# Patient Record
Sex: MALE | Race: WHITE | URBAN - METROPOLITAN AREA
[De-identification: names, ages, dates, MRNs, and addresses within clinical notes are randomized per-mention and may not be internally consistent; named-entity substitution may affect disease eponyms.]

---

## 2019-01-12 ENCOUNTER — INPATIENT (INPATIENT)
Facility: HOSPITAL | Age: 69
LOS: 8 days | Discharge: HOME HEALTH | End: 2019-01-21
Attending: THORACIC SURGERY (CARDIOTHORACIC VASCULAR SURGERY) | Admitting: THORACIC SURGERY (CARDIOTHORACIC VASCULAR SURGERY)
Payer: MEDICARE

## 2019-01-12 VITALS
HEIGHT: 70 IN | TEMPERATURE: 98 F | SYSTOLIC BLOOD PRESSURE: 150 MMHG | RESPIRATION RATE: 18 BRPM | WEIGHT: 218.92 LBS | HEART RATE: 111 BPM | OXYGEN SATURATION: 97 % | DIASTOLIC BLOOD PRESSURE: 81 MMHG

## 2019-01-12 LAB
ALBUMIN SERPL ELPH-MCNC: 4.2 G/DL — SIGNIFICANT CHANGE UP (ref 3.5–5.2)
ALP SERPL-CCNC: 74 U/L — SIGNIFICANT CHANGE UP (ref 30–115)
ALT FLD-CCNC: 19 U/L — SIGNIFICANT CHANGE UP (ref 0–41)
ANION GAP SERPL CALC-SCNC: 9 MMOL/L — SIGNIFICANT CHANGE UP (ref 7–14)
APTT BLD: 27.9 SEC — SIGNIFICANT CHANGE UP (ref 27–39.2)
AST SERPL-CCNC: 27 U/L — SIGNIFICANT CHANGE UP (ref 0–41)
BASOPHILS # BLD AUTO: 0.03 K/UL — SIGNIFICANT CHANGE UP (ref 0–0.2)
BASOPHILS NFR BLD AUTO: 0.4 % — SIGNIFICANT CHANGE UP (ref 0–1)
BILIRUB SERPL-MCNC: 1.1 MG/DL — SIGNIFICANT CHANGE UP (ref 0.2–1.2)
BUN SERPL-MCNC: 12 MG/DL — SIGNIFICANT CHANGE UP (ref 10–20)
CALCIUM SERPL-MCNC: 9.3 MG/DL — SIGNIFICANT CHANGE UP (ref 8.5–10.1)
CHLORIDE SERPL-SCNC: 102 MMOL/L — SIGNIFICANT CHANGE UP (ref 98–110)
CHOLEST SERPL-MCNC: 219 MG/DL — HIGH (ref 100–200)
CK MB CFR SERPL CALC: 21.1 NG/ML — HIGH (ref 0.6–6.3)
CK MB CFR SERPL CALC: 27.3 NG/ML — HIGH (ref 0.6–6.3)
CO2 SERPL-SCNC: 28 MMOL/L — SIGNIFICANT CHANGE UP (ref 17–32)
CREAT SERPL-MCNC: 0.8 MG/DL — SIGNIFICANT CHANGE UP (ref 0.7–1.5)
EOSINOPHIL # BLD AUTO: 0.13 K/UL — SIGNIFICANT CHANGE UP (ref 0–0.7)
EOSINOPHIL NFR BLD AUTO: 1.8 % — SIGNIFICANT CHANGE UP (ref 0–8)
GLUCOSE SERPL-MCNC: 150 MG/DL — HIGH (ref 70–99)
HCT VFR BLD CALC: 43.9 % — SIGNIFICANT CHANGE UP (ref 42–52)
HDLC SERPL-MCNC: 36 MG/DL — LOW
HGB BLD-MCNC: 15.1 G/DL — SIGNIFICANT CHANGE UP (ref 14–18)
IMM GRANULOCYTES NFR BLD AUTO: 0.3 % — SIGNIFICANT CHANGE UP (ref 0.1–0.3)
INR BLD: 1.15 RATIO — SIGNIFICANT CHANGE UP (ref 0.65–1.3)
LIPID PNL WITH DIRECT LDL SERPL: 177 MG/DL — HIGH (ref 4–129)
LYMPHOCYTES # BLD AUTO: 1.41 K/UL — SIGNIFICANT CHANGE UP (ref 1.2–3.4)
LYMPHOCYTES # BLD AUTO: 19.7 % — LOW (ref 20.5–51.1)
MCHC RBC-ENTMCNC: 28.5 PG — SIGNIFICANT CHANGE UP (ref 27–31)
MCHC RBC-ENTMCNC: 34.4 G/DL — SIGNIFICANT CHANGE UP (ref 32–37)
MCV RBC AUTO: 83 FL — SIGNIFICANT CHANGE UP (ref 80–94)
MONOCYTES # BLD AUTO: 0.47 K/UL — SIGNIFICANT CHANGE UP (ref 0.1–0.6)
MONOCYTES NFR BLD AUTO: 6.6 % — SIGNIFICANT CHANGE UP (ref 1.7–9.3)
NEUTROPHILS # BLD AUTO: 5.08 K/UL — SIGNIFICANT CHANGE UP (ref 1.4–6.5)
NEUTROPHILS NFR BLD AUTO: 71.2 % — SIGNIFICANT CHANGE UP (ref 42.2–75.2)
NRBC # BLD: 0 /100 WBCS — SIGNIFICANT CHANGE UP (ref 0–0)
PLATELET # BLD AUTO: 197 K/UL — SIGNIFICANT CHANGE UP (ref 130–400)
POTASSIUM SERPL-MCNC: 4.2 MMOL/L — SIGNIFICANT CHANGE UP (ref 3.5–5)
POTASSIUM SERPL-SCNC: 4.2 MMOL/L — SIGNIFICANT CHANGE UP (ref 3.5–5)
PROT SERPL-MCNC: 6.9 G/DL — SIGNIFICANT CHANGE UP (ref 6–8)
PROTHROM AB SERPL-ACNC: 12.5 SEC — SIGNIFICANT CHANGE UP (ref 9.95–12.87)
RBC # BLD: 5.29 M/UL — SIGNIFICANT CHANGE UP (ref 4.7–6.1)
RBC # FLD: 12 % — SIGNIFICANT CHANGE UP (ref 11.5–14.5)
SODIUM SERPL-SCNC: 139 MMOL/L — SIGNIFICANT CHANGE UP (ref 135–146)
TOTAL CHOLESTEROL/HDL RATIO MEASUREMENT: 6.1 RATIO — HIGH (ref 4–5.5)
TRIGL SERPL-MCNC: 104 MG/DL — SIGNIFICANT CHANGE UP (ref 10–149)
TROPONIN T SERPL-MCNC: 0.58 NG/ML — CRITICAL HIGH
TROPONIN T SERPL-MCNC: 0.65 NG/ML — CRITICAL HIGH
TROPONIN T SERPL-MCNC: 0.7 NG/ML — CRITICAL HIGH
WBC # BLD: 7.14 K/UL — SIGNIFICANT CHANGE UP (ref 4.8–10.8)
WBC # FLD AUTO: 7.14 K/UL — SIGNIFICANT CHANGE UP (ref 4.8–10.8)

## 2019-01-12 RX ORDER — PANTOPRAZOLE SODIUM 20 MG/1
40 TABLET, DELAYED RELEASE ORAL
Qty: 0 | Refills: 0 | Status: DISCONTINUED | OUTPATIENT
Start: 2019-01-12 | End: 2019-01-16

## 2019-01-12 RX ORDER — ASPIRIN/CALCIUM CARB/MAGNESIUM 324 MG
325 TABLET ORAL ONCE
Qty: 0 | Refills: 0 | Status: COMPLETED | OUTPATIENT
Start: 2019-01-12 | End: 2019-01-12

## 2019-01-12 RX ORDER — METOPROLOL TARTRATE 50 MG
25 TABLET ORAL DAILY
Qty: 0 | Refills: 0 | Status: DISCONTINUED | OUTPATIENT
Start: 2019-01-12 | End: 2019-01-15

## 2019-01-12 RX ORDER — ENOXAPARIN SODIUM 100 MG/ML
100 INJECTION SUBCUTANEOUS
Qty: 0 | Refills: 0 | Status: DISCONTINUED | OUTPATIENT
Start: 2019-01-12 | End: 2019-01-15

## 2019-01-12 RX ORDER — SODIUM CHLORIDE 9 MG/ML
1000 INJECTION INTRAMUSCULAR; INTRAVENOUS; SUBCUTANEOUS ONCE
Qty: 0 | Refills: 0 | Status: COMPLETED | OUTPATIENT
Start: 2019-01-12 | End: 2019-01-12

## 2019-01-12 RX ORDER — ATORVASTATIN CALCIUM 80 MG/1
80 TABLET, FILM COATED ORAL AT BEDTIME
Qty: 0 | Refills: 0 | Status: DISCONTINUED | OUTPATIENT
Start: 2019-01-12 | End: 2019-01-16

## 2019-01-12 RX ORDER — CLOPIDOGREL BISULFATE 75 MG/1
75 TABLET, FILM COATED ORAL DAILY
Qty: 0 | Refills: 0 | Status: DISCONTINUED | OUTPATIENT
Start: 2019-01-12 | End: 2019-01-14

## 2019-01-12 RX ORDER — ASPIRIN/CALCIUM CARB/MAGNESIUM 324 MG
81 TABLET ORAL DAILY
Qty: 0 | Refills: 0 | Status: DISCONTINUED | OUTPATIENT
Start: 2019-01-12 | End: 2019-01-16

## 2019-01-12 RX ORDER — INFLUENZA VIRUS VACCINE 15; 15; 15; 15 UG/.5ML; UG/.5ML; UG/.5ML; UG/.5ML
0.5 SUSPENSION INTRAMUSCULAR ONCE
Qty: 0 | Refills: 0 | Status: DISCONTINUED | OUTPATIENT
Start: 2019-01-12 | End: 2019-01-14

## 2019-01-12 RX ADMIN — Medication 20 MILLIGRAM(S): at 17:43

## 2019-01-12 RX ADMIN — CLOPIDOGREL BISULFATE 75 MILLIGRAM(S): 75 TABLET, FILM COATED ORAL at 17:43

## 2019-01-12 RX ADMIN — ENOXAPARIN SODIUM 100 MILLIGRAM(S): 100 INJECTION SUBCUTANEOUS at 17:43

## 2019-01-12 RX ADMIN — Medication 25 MILLIGRAM(S): at 17:43

## 2019-01-12 RX ADMIN — ATORVASTATIN CALCIUM 80 MILLIGRAM(S): 80 TABLET, FILM COATED ORAL at 22:01

## 2019-01-12 RX ADMIN — Medication 325 MILLIGRAM(S): at 13:21

## 2019-01-12 RX ADMIN — PANTOPRAZOLE SODIUM 40 MILLIGRAM(S): 20 TABLET, DELAYED RELEASE ORAL at 17:44

## 2019-01-12 RX ADMIN — SODIUM CHLORIDE 2000 MILLILITER(S): 9 INJECTION INTRAMUSCULAR; INTRAVENOUS; SUBCUTANEOUS at 12:03

## 2019-01-12 NOTE — ED PROVIDER NOTE - PROGRESS NOTE DETAILS
Lab called with trop of 0.58 - will give ASA. EKG shows inverted t waves in anterior leads. Pt to be admitted to CCU tele as per Dr. Potts. Will admit to hospitalist and consult cardiology.

## 2019-01-12 NOTE — CONSULT NOTE ADULT - SUBJECTIVE AND OBJECTIVE BOX
Patient is a 68y old  Male who presents with a chief complaint of chest pain.     HPI: 69yo M with h/o anxiety p/w intermittent chest pain for one week in the mid sternal region. It was worse last night, 5/10 intensity and so he came to ER.    No smoking or alcohol or family history of CVD.   Denies any other complaints. No fever, nausea, vomiting, SOB, bowel and bladder probs.    Lives at home with family. Fully functional.       PAST MEDICAL & SURGICAL HISTORY:  Depression  No significant past surgical history    Allergies  cats (Unknown)  No Known Drug Allergies      Family history : no cardiovascular family history     Home Medications:  Paxil 20 mg oral tablet: 1 tab(s) orally once a day (12 Jan 2019 11:29)    Occupation:  Alochol: Denied  Smoking: Denied  Drug Use: Denied  Marital Status:         ROS: as in HPI; All other systems reviewed are negative    ICU Vital Signs Last 24 Hrs  T(C): 36.1 (12 Jan 2019 13:23), Max: 36.6 (12 Jan 2019 11:21)  T(F): 97 (12 Jan 2019 13:23), Max: 97.9 (12 Jan 2019 11:21)  HR: 68 (12 Jan 2019 13:23) (68 - 111)  BP: 152/80 (12 Jan 2019 13:23) (150/81 - 153/89)  BP(mean): --  ABP: --  ABP(mean): --  RR: 18 (12 Jan 2019 13:23) (18 - 18)  SpO2: 97% (12 Jan 2019 13:23) (97% - 98%)        Physical Examination:    General: No acute distress.  Alert, oriented, interactive, nonfocal    HEENT: Pupils equal, reactive to light.  Symmetric.    PULM: Clear to auscultation bilaterally, no significant sputum production    CVS: Regular rate and rhythm, no murmurs, rubs, or gallops    ABD: Soft, nondistended, nontender, normoactive bowel sounds, no masses    EXT: No edema, nontender, no clubbing     SKIN: Warm and well perfused, no rashes noted.    Neurology : no motor or sensory deficit     Musculoskeletal : move all extremety     Lymphatic system: no Palpable node               I&O's Detail        LABS:                        15.1   7.14  )-----------( 197      ( 12 Jan 2019 11:42 )             43.9     12 Jan 2019 11:42    139    |  102    |  12     ----------------------------<  150    4.2     |  28     |  0.8      Ca    9.3        12 Jan 2019 11:42    TPro  6.9    /  Alb  4.2    /  TBili  1.1    /  DBili  x      /  AST  27     /  ALT  19     /  AlkPhos  74     12 Jan 2019 11:42  Amylase x     lipase x          CARDIAC MARKERS ( 12 Jan 2019 11:46 )  x     / 0.58 ng/mL / x     / x     / x          CAPILLARY BLOOD GLUCOSE            Culture        MEDICATIONS  (STANDING):    MEDICATIONS  (PRN):        RADIOLOGY: ***     CXR:  TLC:  OG:  ET tube:          ECHO: Patient is a 68y old  Male who presents with a chief complaint of chest pain.     HP  Pt is a 69 y/o Male, PMHX depression/ anxiety on Paxil, presents to ED w/ chest pain intermittently for the last 1 week. Pt states symptoms are worse at night. Denies fever/chills, cough, hemoptysis, URI symptoms, abdominal pain, n/v/d, back pain, numbness, tingling, weakness, smoking, cardiac hx/sx, family hx of cardiac disease, hx of DVT/PE, recent sx, recent travel, trauma/injury.  No smoking or alcohol or family history of CVD.   Denies any other complaints. No fever, nausea, vomiting, SOB, bowel and bladder probs. In Ed found to have high troponin with EKG changes called to evaluate.    Lives at home with family. Fully functional.       PAST MEDICAL & SURGICAL HISTORY:  Depression  No significant past surgical history    Allergies  cats (Unknown)  No Known Drug Allergies      Family history : no cardiovascular family history     Home Medications:  Paxil 20 mg oral tablet: 1 tab(s) orally once a day (12 Jan 2019 11:29)    Occupation:  Alcohol: Denied  Smoking: Denied  Drug Use: Denied  Marital Status:         ROS: as in HPI; All other systems reviewed are negative    ICU Vital Signs Last 24 Hrs  T(C): 36.1 (12 Jan 2019 13:23), Max: 36.6 (12 Jan 2019 11:21)  T(F): 97 (12 Jan 2019 13:23), Max: 97.9 (12 Jan 2019 11:21)  HR: 68 (12 Jan 2019 13:23) (68 - 111)  BP: 152/80 (12 Jan 2019 13:23) (150/81 - 153/89)  RR: 18 (12 Jan 2019 13:23) (18 - 18)  SpO2: 97% (12 Jan 2019 13:23) (97% - 98%)        Physical Examination:    General: No acute distress.  Alert, oriented, interactive, nonfocal    HEENT: Pupils equal, reactive to light.  Symmetric.    PULM: Clear to auscultation bilaterally, no significant sputum production    CVS: Regular rate and rhythm, no murmurs, rubs, or gallops    ABD: Soft, nondistended, nontender, normoactive bowel sounds, no masses    EXT: No edema, nontender, no clubbing     SKIN: Warm and well perfused, no rashes noted.    Neurology : no motor or sensory deficit     Lymphatic system: no Palpable node               I&O's Detail        LABS:                        15.1   7.14  )-----------( 197      ( 12 Jan 2019 11:42 )             43.9     12 Jan 2019 11:42    139    |  102    |  12     ----------------------------<  150    4.2     |  28     |  0.8      Ca    9.3        12 Jan 2019 11:42    TPro  6.9    /  Alb  4.2    /  TBili  1.1    /  DBili  x      /  AST  27     /  ALT  19     /  AlkPhos  74     12 Jan 2019 11:42  Amylase x     lipase x          CARDIAC MARKERS ( 12 Jan 2019 11:46 )  x     / 0.58 ng/mL / x     / x     / x          EKG/ CXR reviewed

## 2019-01-12 NOTE — ED PROVIDER NOTE - PHYSICAL EXAMINATION
VITAL SIGNS: I have reviewed the initial vital signs.   CONSTITUTIONAL: Awake, alert. Well-developed; well-nourished; in no distress. Non-toxic appearing.   SKIN: No rash, vesicles/lesion, abrasions or lacerations. No ecchymosis or signs of trauma.   HEAD: Normocephalic; atraumatic.   EYES: Symmetrical, no discharge or signs of trauma. Conjunctiva and sclera clear.  ENT: Airway patent. MMM.   NECK: Supple; non-tender.   CARD: No chest wall deformity or tenderness. S1, S2 normal; no murmurs, gallops, or rubs. Regular rate and rhythm.  RESP: Good air movement. Lungs CTAB. No crackles, wheezes, rales or rhonchi.  ABD: Soft; non-distended; non-tender.   EXT: No bony deformity or tenderness. Normal ROM x 4 extremities.   NEURO: A&Ox3. GCS 15. Normal speech.

## 2019-01-12 NOTE — ED ADULT NURSE NOTE - NSIMPLEMENTINTERV_GEN_ALL_ED
Implemented All Universal Safety Interventions:  [place] to call system. Call bell, personal items and telephone within reach. Instruct patient to call for assistance. Room bathroom lighting operational. Non-slip footwear when patient is off stretcher. Physically safe environment: no spills, clutter or unnecessary equipment. Stretcher in lowest position, wheels locked, appropriate side rails in place.

## 2019-01-12 NOTE — ED PROVIDER NOTE - MEDICAL DECISION MAKING DETAILS
Pt presents with prolonged chest pain last night. ACS evaluation found pt + troponin. Findings consistent with NONST elevation MI> Pt advised about need to be admitted for further monitoring and testing.

## 2019-01-12 NOTE — ED PROVIDER NOTE - NS ED ROS FT
Except as documented in HPI, all other ROS negative.   GENERAL: Denies fever/chills, loss of appetite/weight or fatigue.  SKIN: Denies rashes, abrasions, lacerations, ecchymosis, erythema, or edema.  HEAD: Denies headache, dizziness or trauma.  CARDIAC: Denies chest pain, palpitations, or SOB.   RESPIRATORY: Denies SOB, cough, hemoptysis or wheezing.   GI: Denies abdominal pain, n/v/d, bloody stools or melena.   MSK: Denies myalgias, bony deformity or pain.   NEURO: Denies paresthesias, tingling, weakness.

## 2019-01-12 NOTE — CONSULT NOTE ADULT - ASSESSMENT
IMPRESSION: ACS        PLAN:    CNS: Alert and oriented.  No sedation.     HEENT: Oral care. HOB > 45 degrees.      PULMONARY: Nasal cannula PRN. Keep pulse ox>92%    CARDIOVASCULAR: 2de cho. CE x 2. Cardiology eval. Heparin drip for now. ASA, lipitor, beta blocker.     GI: GI prophylaxis.  Feeding     RENAL: F/u lytes.    INFECTIOUS DISEASE: Pan cx.      HEMATOLOGICAL:  DVT prophylaxis.    ENDOCRINE:  Follow up FS.  Insulin protocol if needed.    MUSCULOSKELETAL:        CRITICAL CARE TIME SPENT: *** IMPRESSION: ACS        PLAN:    CNS: Alert and oriented.  No sedation.     HEENT: Oral care. HOB > 45 degrees.      PULMONARY: Nasal cannula PRN. Keep pulse ox>92%    CARDIOVASCULAR: 2de cho. CE x 2. Cardiology eval. Therapeutic lovenox for now. ASA, lipitor, beta blocker.     GI: GI prophylaxis.  Feeding     RENAL: F/u lytes.    INFECTIOUS DISEASE: Pan cx.      HEMATOLOGICAL:  DVT prophylaxis.    ENDOCRINE:  Follow up FS.  Insulin protocol if needed.    MUSCULOSKELETAL:        CRITICAL CARE TIME SPENT: *** IMPRESSION:     CP/ HIGH TROP/ NTEMI        PLAN:    CNS: Avoid CNS depressant    HEENT: Oral care. HOB > 45 degrees.      PULMONARY: Keep pulse ox>92%    CARDIOVASCULAR: 2de cho. CE x 2. Cardiology eval. Therapeutic lovenox for now. ASA, lipitor, beta blocker. repeat EKG    GI: GI prophylaxis.  Feeding     RENAL: F/u lytes.    INFECTIOUS DISEASE: Pan cx.      HEMATOLOGICAL:  DVT prophylaxis.    ENDOCRINE:  Follow up FS.  Insulin protocol if needed.    ADMIT TO CCU

## 2019-01-12 NOTE — ED ADULT NURSE NOTE - OBJECTIVE STATEMENT
Patient denied pain at rounds , had pain yesterday and intermittently x 1 week . He was sent to ER with EKG changes

## 2019-01-12 NOTE — ED PROVIDER NOTE - ATTENDING CONTRIBUTION TO CARE
One week of chest pain. Pain with exertion. On exam s1S2 rrr, lungs clear, abdomen is soft nontender, ext neg for swelling or tenderness.

## 2019-01-12 NOTE — ED PROVIDER NOTE - OBJECTIVE STATEMENT
Pt is a 69 y/o Male, PMHX depression on Paxil, presents to ED w/ right sided chest pain intermittently for the last 1 week. Pt states symptoms are worse at night. Denies fever/chills, cough, hemoptysis, URI symptoms, abdominal pain, n/v/d, back pain, numbness, tingling, weakness, smoking, cardiac hx/sx, family hx of cardiac disease, hx of DVT/PE, recent sx, recent travel, trauma/injury.

## 2019-01-13 LAB
ALBUMIN SERPL ELPH-MCNC: 4 G/DL — SIGNIFICANT CHANGE UP (ref 3.5–5.2)
ALP SERPL-CCNC: 70 U/L — SIGNIFICANT CHANGE UP (ref 30–115)
ALT FLD-CCNC: 16 U/L — SIGNIFICANT CHANGE UP (ref 0–41)
ANION GAP SERPL CALC-SCNC: 9 MMOL/L — SIGNIFICANT CHANGE UP (ref 7–14)
APTT BLD: 29.3 SEC — SIGNIFICANT CHANGE UP (ref 27–39.2)
AST SERPL-CCNC: 20 U/L — SIGNIFICANT CHANGE UP (ref 0–41)
BILIRUB SERPL-MCNC: 1.9 MG/DL — HIGH (ref 0.2–1.2)
BUN SERPL-MCNC: 12 MG/DL — SIGNIFICANT CHANGE UP (ref 10–20)
CALCIUM SERPL-MCNC: 9 MG/DL — SIGNIFICANT CHANGE UP (ref 8.5–10.1)
CHLORIDE SERPL-SCNC: 104 MMOL/L — SIGNIFICANT CHANGE UP (ref 98–110)
CHOLEST SERPL-MCNC: 201 MG/DL — HIGH (ref 100–200)
CK MB CFR SERPL CALC: 12.8 NG/ML — HIGH (ref 0.6–6.3)
CK SERPL-CCNC: 267 U/L — HIGH (ref 0–225)
CO2 SERPL-SCNC: 28 MMOL/L — SIGNIFICANT CHANGE UP (ref 17–32)
CREAT SERPL-MCNC: 0.9 MG/DL — SIGNIFICANT CHANGE UP (ref 0.7–1.5)
GLUCOSE SERPL-MCNC: 128 MG/DL — HIGH (ref 70–99)
HCT VFR BLD CALC: 43.1 % — SIGNIFICANT CHANGE UP (ref 42–52)
HDLC SERPL-MCNC: 33 MG/DL — LOW
HGB BLD-MCNC: 14.9 G/DL — SIGNIFICANT CHANGE UP (ref 14–18)
INR BLD: 1.19 RATIO — SIGNIFICANT CHANGE UP (ref 0.65–1.3)
LIPID PNL WITH DIRECT LDL SERPL: 161 MG/DL — HIGH (ref 4–129)
MAGNESIUM SERPL-MCNC: 2.1 MG/DL — SIGNIFICANT CHANGE UP (ref 1.8–2.4)
MCHC RBC-ENTMCNC: 28.9 PG — SIGNIFICANT CHANGE UP (ref 27–31)
MCHC RBC-ENTMCNC: 34.6 G/DL — SIGNIFICANT CHANGE UP (ref 32–37)
MCV RBC AUTO: 83.7 FL — SIGNIFICANT CHANGE UP (ref 80–94)
NRBC # BLD: 0 /100 WBCS — SIGNIFICANT CHANGE UP (ref 0–0)
PLATELET # BLD AUTO: 195 K/UL — SIGNIFICANT CHANGE UP (ref 130–400)
POTASSIUM SERPL-MCNC: 4.5 MMOL/L — SIGNIFICANT CHANGE UP (ref 3.5–5)
POTASSIUM SERPL-SCNC: 4.5 MMOL/L — SIGNIFICANT CHANGE UP (ref 3.5–5)
PROT SERPL-MCNC: 6.4 G/DL — SIGNIFICANT CHANGE UP (ref 6–8)
PROTHROM AB SERPL-ACNC: 12.9 SEC — HIGH (ref 9.95–12.87)
RBC # BLD: 5.15 M/UL — SIGNIFICANT CHANGE UP (ref 4.7–6.1)
RBC # FLD: 12 % — SIGNIFICANT CHANGE UP (ref 11.5–14.5)
SODIUM SERPL-SCNC: 141 MMOL/L — SIGNIFICANT CHANGE UP (ref 135–146)
TOTAL CHOLESTEROL/HDL RATIO MEASUREMENT: 6.1 RATIO — HIGH (ref 4–5.5)
TRIGL SERPL-MCNC: 96 MG/DL — SIGNIFICANT CHANGE UP (ref 10–149)
TROPONIN T SERPL-MCNC: 0.59 NG/ML — CRITICAL HIGH
TYPE + AB SCN PNL BLD: SIGNIFICANT CHANGE UP
WBC # BLD: 5.89 K/UL — SIGNIFICANT CHANGE UP (ref 4.8–10.8)
WBC # FLD AUTO: 5.89 K/UL — SIGNIFICANT CHANGE UP (ref 4.8–10.8)

## 2019-01-13 RX ORDER — CHLORHEXIDINE GLUCONATE 213 G/1000ML
1 SOLUTION TOPICAL
Qty: 0 | Refills: 0 | Status: DISCONTINUED | OUTPATIENT
Start: 2019-01-13 | End: 2019-01-16

## 2019-01-13 RX ADMIN — Medication 25 MILLIGRAM(S): at 06:44

## 2019-01-13 RX ADMIN — CLOPIDOGREL BISULFATE 75 MILLIGRAM(S): 75 TABLET, FILM COATED ORAL at 11:50

## 2019-01-13 RX ADMIN — Medication 81 MILLIGRAM(S): at 11:50

## 2019-01-13 RX ADMIN — ENOXAPARIN SODIUM 100 MILLIGRAM(S): 100 INJECTION SUBCUTANEOUS at 17:28

## 2019-01-13 RX ADMIN — ENOXAPARIN SODIUM 100 MILLIGRAM(S): 100 INJECTION SUBCUTANEOUS at 07:58

## 2019-01-13 RX ADMIN — ATORVASTATIN CALCIUM 80 MILLIGRAM(S): 80 TABLET, FILM COATED ORAL at 21:31

## 2019-01-13 RX ADMIN — PANTOPRAZOLE SODIUM 40 MILLIGRAM(S): 20 TABLET, DELAYED RELEASE ORAL at 07:59

## 2019-01-13 RX ADMIN — Medication 20 MILLIGRAM(S): at 11:50

## 2019-01-13 NOTE — H&P ADULT - ASSESSMENT
69 yo M presents with complaint of intermittent chest pain for 1 week's duration.     NSTEMI  -EKG revealing of inverted t waves in anterior leads, positive troponemia, trending down  -Awaiting Cardiology evaluation  -Continue statin, toprol, ASA, plavix  -F/U 2D-Echo  -F/U lipid panel, HgbA1C%    Depression  -Continue paxil    Disposition: Home when stable

## 2019-01-13 NOTE — PROGRESS NOTE ADULT - SUBJECTIVE AND OBJECTIVE BOX
CARDIOLOGY CONSULT NOTE     CHIEF COMPLAINT/REASON FOR CONSULT:    HPI: 68 [place] male . He one week ago developed chest walking up steps. This got worse. He then had pain at rest. No pain now      PAST MEDICAL & SURGICAL HISTORY:  Depression  No significant past surgical history      Cardiac Risks:   [ ]HTN, [ ] DM, [ ] Smoking, [ ] FH,  [ x] Lipids        MEDICATIONS:  MEDICATIONS  (STANDING):  aspirin  chewable 81 milliGRAM(s) Oral daily  atorvastatin 80 milliGRAM(s) Oral at bedtime  clopidogrel Tablet 75 milliGRAM(s) Oral daily  enoxaparin Injectable 100 milliGRAM(s) SubCutaneous two times a day  influenza   Vaccine 0.5 milliLiter(s) IntraMuscular once  metoprolol succinate ER 25 milliGRAM(s) Oral daily  pantoprazole    Tablet 40 milliGRAM(s) Oral before breakfast  PARoxetine 20 milliGRAM(s) Oral daily      FAMILY HISTORY:      SOCIAL HISTORY:      [ ] Marital status   Allergies    cats (Unknown)  No Known Drug Allergies    Intolerances    	    REVIEW OF SYSTEMS:  CONSTITUTIONAL: No fever, weight loss, or fatigue  EYES: No eye pain, visual disturbances, or discharge  ENMT:  No difficulty hearing, tinnitus, vertigo; No sinus or throat pain  NECK: No pain or stiffness  RESPIRATORY: No cough, wheezing, chills or hemoptysis; No Shortness of Breath  CARDIOVASCULAR: See above  GASTROINTESTINAL: No abdominal or epigastric pain. No nausea, vomiting, or hematemesis; No diarrhea or constipation. No melena or hematochezia.  GENITOURINARY: No dysuria, frequency, hematuria, or incontinence  NEUROLOGICAL: No headaches, memory loss, loss of strength, numbness, or tremors  SKIN: No itching, burning, rashes, or lesions   	        PHYSICAL EXAM:  T(C): 36.2 (01-12-19 @ 23:10), Max: 37 (01-12-19 @ 15:30)  HR: 67 (01-13-19 @ 06:50) (57 - 111)  BP: 115/72 (01-13-19 @ 06:50) (115/72 - 153/89)  RR: 18 (01-12-19 @ 23:10) (18 - 18)  SpO2: 96% (01-13-19 @ 06:50) (95% - 98%)  Wt(kg): --  I&O's Summary    12 Jan 2019 07:01 - 13 Jan 2019 07:00  --------------------------------------------------------  IN: 0 mL / OUT: 250 mL / NET: -250 mL        Appearance: Normal	  Psychiatry: A & O x 3, Mood & affect appropriate  HEENT:   Normal oral mucosa, PERRL, EOMI	  Lymphatic: No lymphadenopathy  Cardiovascular: Normal S1 S2,RRR, No JVD, No murmurs  Respiratory: Lungs clear to auscultation	  Gastrointestinal:  Soft, Non-tender, + BS	  Skin: No rashes, No ecchymoses, No cyanosis	  Neurologic: Non-focal  Extremities: Normal range of motion, No clubbing, cyanosis or edema  Vascular: Peripheral pulses palpable 2+ bilaterally      ECG:  	NSR ant ischemia    	  LABS:	 	    CARDIAC MARKERS:                                    14.9   5.89  )-----------( 195      ( 13 Jan 2019 06:35 )             43.1     01-13    141  |  104  |  12  ----------------------------<  128<H>  4.5   |  28  |  0.9    Ca    9.0      13 Jan 2019 06:35  Mg     2.1     01-13    TPro  6.4  /  Alb  4.0  /  TBili  1.9<H>  /  DBili  x   /  AST  20  /  ALT  16  /  AlkPhos  70  01-13    PT/INR - ( 13 Jan 2019 06:35 )   PT: 12.90 sec;   INR: 1.19 ratio         PTT - ( 13 Jan 2019 06:35 )  PTT:29.3 sec

## 2019-01-13 NOTE — H&P ADULT - HISTORY OF PRESENT ILLNESS
67 yo M with PMHx of depression/ anxiety on Paxil, presents to ED with complaint of intermittent chest pain for 1 week's duration. Pt states symptoms are worse at night. Denies fever/chills, cough, hemoptysis, URI symptoms, abdominal pain, n/v/d, back pain, numbness, tingling, weakness, family hx of cardiac disease, hx of DVT/PE, recent sx, recent travel, trauma/injury. 67 yo M with PMHx of depression/ anxiety on Paxil, presents to ED with complaint of intermittent chest pain for 1 week's duration. Pt states symptoms are worse at night. Denies fever/chills, cough, hemoptysis, URI symptoms, abdominal pain, n/v/d, back pain, numbness, tingling, weakness, family hx of cardiac disease, hx of DVT/PE, recent sx, recent travel, trauma/injury. Chest pain described as sharp, intermittent, nonradiating, occurs at rest, occurs randomly, not worsened with exertion. Never had stress test, never smoker. No family history of cardiac disease.

## 2019-01-13 NOTE — H&P ADULT - NSHPLABSRESULTS_GEN_ALL_CORE
14.9   5.89  )-----------( 195      ( 13 Jan 2019 06:35 )             43.1       01-13    141  |  104  |  12  ----------------------------<  128<H>  4.5   |  28  |  0.9    Ca    9.0      13 Jan 2019 06:35  Mg     2.1     01-13    TPro  6.4  /  Alb  4.0  /  TBili  1.9<H>  /  DBili  x   /  AST  20  /  ALT  16  /  AlkPhos  70  01-13            PT/INR - ( 13 Jan 2019 06:35 )   PT: 12.90 sec;   INR: 1.19 ratio         PTT - ( 13 Jan 2019 06:35 )  PTT:29.3 sec        CARDIAC MARKERS ( 12 Jan 2019 18:50 )  x     / 0.65 ng/mL / x     / x     / 21.1 ng/mL  CARDIAC MARKERS ( 12 Jan 2019 14:43 )  x     / 0.70 ng/mL / x     / x     / 27.3 ng/mL  CARDIAC MARKERS ( 12 Jan 2019 11:46 )  x     / 0.58 ng/mL / x     / x     / x

## 2019-01-13 NOTE — H&P ADULT - ATTENDING COMMENTS
#chest pain  treating as ACS per cards  therapeutic lovenox for 48 hours  asa, plavix  lipitor 80  check TTE  f/u cards if need LHC

## 2019-01-14 LAB
ALBUMIN SERPL ELPH-MCNC: 4.1 G/DL — SIGNIFICANT CHANGE UP (ref 3.5–5.2)
ALP SERPL-CCNC: 71 U/L — SIGNIFICANT CHANGE UP (ref 30–115)
ALT FLD-CCNC: 17 U/L — SIGNIFICANT CHANGE UP (ref 0–41)
ANION GAP SERPL CALC-SCNC: 10 MMOL/L — SIGNIFICANT CHANGE UP (ref 7–14)
APTT BLD: 32.3 SEC — SIGNIFICANT CHANGE UP (ref 27–39.2)
AST SERPL-CCNC: 18 U/L — SIGNIFICANT CHANGE UP (ref 0–41)
BILIRUB SERPL-MCNC: 1.5 MG/DL — HIGH (ref 0.2–1.2)
BUN SERPL-MCNC: 15 MG/DL — SIGNIFICANT CHANGE UP (ref 10–20)
CALCIUM SERPL-MCNC: 9.3 MG/DL — SIGNIFICANT CHANGE UP (ref 8.5–10.1)
CHLORIDE SERPL-SCNC: 104 MMOL/L — SIGNIFICANT CHANGE UP (ref 98–110)
CK MB CFR SERPL CALC: 10.2 NG/ML — HIGH (ref 0.6–6.3)
CK SERPL-CCNC: 280 U/L — HIGH (ref 0–225)
CO2 SERPL-SCNC: 28 MMOL/L — SIGNIFICANT CHANGE UP (ref 17–32)
CREAT SERPL-MCNC: 0.9 MG/DL — SIGNIFICANT CHANGE UP (ref 0.7–1.5)
ESTIMATED AVERAGE GLUCOSE: 137 MG/DL — HIGH (ref 68–114)
GLUCOSE BLDC GLUCOMTR-MCNC: 115 MG/DL — HIGH (ref 70–99)
GLUCOSE SERPL-MCNC: 124 MG/DL — HIGH (ref 70–99)
HBA1C BLD-MCNC: 6.4 % — HIGH (ref 4–5.6)
HCT VFR BLD CALC: 43.3 % — SIGNIFICANT CHANGE UP (ref 42–52)
HGB BLD-MCNC: 15 G/DL — SIGNIFICANT CHANGE UP (ref 14–18)
INR BLD: 1.18 RATIO — SIGNIFICANT CHANGE UP (ref 0.65–1.3)
MAGNESIUM SERPL-MCNC: 2.1 MG/DL — SIGNIFICANT CHANGE UP (ref 1.8–2.4)
MCHC RBC-ENTMCNC: 29 PG — SIGNIFICANT CHANGE UP (ref 27–31)
MCHC RBC-ENTMCNC: 34.6 G/DL — SIGNIFICANT CHANGE UP (ref 32–37)
MCV RBC AUTO: 83.8 FL — SIGNIFICANT CHANGE UP (ref 80–94)
NRBC # BLD: 0 /100 WBCS — SIGNIFICANT CHANGE UP (ref 0–0)
PLATELET # BLD AUTO: 197 K/UL — SIGNIFICANT CHANGE UP (ref 130–400)
POTASSIUM SERPL-MCNC: 4.5 MMOL/L — SIGNIFICANT CHANGE UP (ref 3.5–5)
POTASSIUM SERPL-SCNC: 4.5 MMOL/L — SIGNIFICANT CHANGE UP (ref 3.5–5)
PROT SERPL-MCNC: 6.6 G/DL — SIGNIFICANT CHANGE UP (ref 6–8)
PROTHROM AB SERPL-ACNC: 12.8 SEC — SIGNIFICANT CHANGE UP (ref 9.95–12.87)
RBC # BLD: 5.17 M/UL — SIGNIFICANT CHANGE UP (ref 4.7–6.1)
RBC # FLD: 11.9 % — SIGNIFICANT CHANGE UP (ref 11.5–14.5)
SODIUM SERPL-SCNC: 142 MMOL/L — SIGNIFICANT CHANGE UP (ref 135–146)
TROPONIN T SERPL-MCNC: 0.64 NG/ML — CRITICAL HIGH
WBC # BLD: 5.98 K/UL — SIGNIFICANT CHANGE UP (ref 4.8–10.8)
WBC # FLD AUTO: 5.98 K/UL — SIGNIFICANT CHANGE UP (ref 4.8–10.8)

## 2019-01-14 PROCEDURE — 93880 EXTRACRANIAL BILAT STUDY: CPT | Mod: 26

## 2019-01-14 RX ORDER — SODIUM CHLORIDE 9 MG/ML
400 INJECTION INTRAMUSCULAR; INTRAVENOUS; SUBCUTANEOUS
Qty: 0 | Refills: 0 | Status: DISCONTINUED | OUTPATIENT
Start: 2019-01-14 | End: 2019-01-15

## 2019-01-14 RX ADMIN — Medication 20 MILLIGRAM(S): at 10:01

## 2019-01-14 RX ADMIN — CHLORHEXIDINE GLUCONATE 1 APPLICATION(S): 213 SOLUTION TOPICAL at 10:00

## 2019-01-14 RX ADMIN — Medication 25 MILLIGRAM(S): at 06:00

## 2019-01-14 RX ADMIN — Medication 81 MILLIGRAM(S): at 10:01

## 2019-01-14 RX ADMIN — ATORVASTATIN CALCIUM 80 MILLIGRAM(S): 80 TABLET, FILM COATED ORAL at 23:43

## 2019-01-14 RX ADMIN — PANTOPRAZOLE SODIUM 40 MILLIGRAM(S): 20 TABLET, DELAYED RELEASE ORAL at 06:00

## 2019-01-14 RX ADMIN — CLOPIDOGREL BISULFATE 75 MILLIGRAM(S): 75 TABLET, FILM COATED ORAL at 10:01

## 2019-01-14 NOTE — PROGRESS NOTE ADULT - SUBJECTIVE AND OBJECTIVE BOX
Patient is a 68y old  Male who presents with a chief complaint of chest pain (13 Jan 2019 08:23)      T(F): 97.7 (01-13-19 @ 23:10), Max: 98.4 (01-13-19 @ 15:23)  HR: 63 (01-14-19 @ 06:00)  BP: 119/77 (01-14-19 @ 06:00)  RR: 18 (01-14-19 @ 06:00)  SpO2: 93% (01-14-19 @ 06:00) (93% - 98%)    PHYSICAL EXAM:  GENERAL: NAD, well-groomed, well-developed  HEAD:  Atraumatic, Normocephalic  EYES: EOMI, PERRLA, conjunctiva and sclera clear  ENMT: No tonsillar erythema, exudates, or enlargement; Moist mucous membranes, Good dentition, No lesions  NECK: Supple, No JVD, Normal thyroid  NERVOUS SYSTEM:  Alert & Oriented X3,  Motor Strength 5/5 B/L upper and lower extremities  CHEST/LUNG: Clear to percussion bilaterally; No rales, rhonchi, wheezing, or rubs  HEART: Regular rate and rhythm; No murmurs, rubs, or gallops  ABDOMEN: Soft, Nontender, Nondistended; Bowel sounds present  EXTREMITIES:   No clubbing, cyanosis, or edema  LYMPH: No lymphadenopathy noted  SKIN: No rashes or lesions    labs  01-13    141  |  104  |  12  ----------------------------<  128<H>  4.5   |  28  |  0.9    Ca    9.0      13 Jan 2019 06:35  Mg     2.1     01-13    TPro  6.4  /  Alb  4.0  /  TBili  1.9<H>  /  DBili  x   /  AST  20  /  ALT  16  /  AlkPhos  70  01-13                          15.0   5.98  )-----------( 197      ( 14 Jan 2019 07:08 )             43.3       PT/INR - ( 13 Jan 2019 06:35 )   PT: 12.90 sec;   INR: 1.19 ratio         PTT - ( 13 Jan 2019 06:35 )  PTT:29.3 sec    Creatine Kinase, Serum: 267 U/L <H> (01-13-19 @ 10:44)  Troponin T, Serum: 0.59 ng/mL <HH> (01-13-19 @ 10:44)      aspirin  chewable 81 milliGRAM(s) Oral daily  atorvastatin 80 milliGRAM(s) Oral at bedtime  chlorhexidine 4% Liquid 1 Application(s) Topical <User Schedule>  clopidogrel Tablet 75 milliGRAM(s) Oral daily  enoxaparin Injectable 100 milliGRAM(s) SubCutaneous two times a day  influenza   Vaccine 0.5 milliLiter(s) IntraMuscular once  metoprolol succinate ER 25 milliGRAM(s) Oral daily  pantoprazole    Tablet 40 milliGRAM(s) Oral before breakfast  PARoxetine 20 milliGRAM(s) Oral daily

## 2019-01-14 NOTE — CONSULT NOTE ADULT - SUBJECTIVE AND OBJECTIVE BOX
Surgeon: Dr. Drake    Consult requesting by: Dr. Raymond  PMD: Dr. Vu    HISTORY OF PRESENT ILLNESS: 67 yo M with PMHx of depression/anxiety on Paxil, presents to ED with complaints of intermittent chest pain for 1 week duration. Pt states symptoms are worse at night and occasionally occurs at rest. Denies fever/chills, cough, hemoptysis, URI symptoms, abdominal pain, n/v/d, back pain, numbness, tingling, weakness, family hx of cardiac disease, hx of DVT/PE, recent sx, recent travel, trauma/injury. Chest pain described as sharp, intermittent, non-radiating, occurs at rest, occurs randomly, not worsened with exertion. Never had stress test, never smoker. No family history of cardiac disease. (13 Jan 2019 08:23). Patient was evaluated by cardiology determined to be in ACS and NSTEMI in ED. Pt transferred to [place] for cardiac cath which revealed 3v CAD w/preserved EF.      NYHA functional class    [ ] Class I (no limitation) [ x] Class II (slight limitation) [ ] Class III (marked limitation) [ ] Class IV (symptoms at rest)    PAST MEDICAL & SURGICAL HISTORY:  Depression  No significant past surgical history      MEDICATIONS  (STANDING):  aspirin  chewable 81 milliGRAM(s) Oral daily  atorvastatin 80 milliGRAM(s) Oral at bedtime  chlorhexidine 4% Liquid 1 Application(s) Topical <User Schedule>  enoxaparin Injectable 100 milliGRAM(s) SubCutaneous two times a day  metoprolol succinate ER 25 milliGRAM(s) Oral daily  pantoprazole    Tablet 40 milliGRAM(s) Oral before breakfast  PARoxetine 20 milliGRAM(s) Oral daily  sodium chloride 0.9%. 400 milliLiter(s) (100 mL/Hr) IV Continuous <Continuous>    MEDICATIONS  (PRN):    Antiplatelet therapy:      Plavix 75 mg                      Last dose/amt: 1/14/19      Allergies  cats (Unknown)  No Known Drug Allergies  Intolerances      SOCIAL HISTORY:  Smoker: [ ] Yes  [x ] No        PACK YEARS:                         WHEN QUIT?  ETOH use: [ ] Yes  [ x] No              FREQUENCY / QUANTITY:  Ilicit Drug use:  [ ] Yes  [x ] No  Occupation: [employer]	  Lives with: wife  Assisted device use: n/a  5 meter walk test: 1_5___sec, 2_5___sec, 3__5_sec    FAMILY HISTORY:  No family history     Review of Systems  CONSTITUTIONAL:  Fevers[ ] chills[ ] sweats[ ] fatigue[ ] weight loss[ ] weight gain [ ]                                     NEGATIVE [X ]   NEURO:  paresthesias[ ] seizures [ ]  syncope [ ]  confusion [ ]                                                                                NEGATIVE[ X]   EYES: glasses[ ]  blurry vision[ ]  discharge[ ] pain[ ] glaucoma [ ]                                                                          NEGATIVE[X ]   ENMT:  difficulty hearing [ ]  vertigo[ ]  dysphagia[ ] epistaxis[ ] recent dental work [ ]                                    NEGATIVE[ X]   CV:  chest pain[x ] palpitations[ ] CADET [x ] diaphoresis [ ]                                                                                              RESPIRATORY:  wheezing[ ] SOB[x ] cough [ ] sputum[ ] hemoptysis[ ]                                                                     GI:  nausea[ ]  vomiting [ ]  diarrhea[ ] constipation [ ] melena [ ]                                                                         NEGATIVE[ X]   : hematuria[ ]  dysuria[ ] urgency[ ] incontinence[ ]                                                                                            NEGATIVE[ X]   MUSKULOSKELETAL:  arthritis[ ]  joint swelling [ ] muscle weakness [ ] Hx vein stripping [ ]                             NEGATIVE[X ]   SKIN/BREAST:  rash[ ] itching [ ]  hair loss[ ] masses[ ]                                                                                              NEGATIVE[ X]   PSYCH:  dementia [ ] depression [ ] anxiety[ ]                                                                                                               NEGATIVE[X ]   HEME/LYMPH:  bruises easily[ ] enlarged lymph nodes[ ] tender lymph nodes[ ]                                               NEGATIVE[ X]   ENDOCRINE:  cold intolerance[ ] heat intolerance[ ] polydipsia[ ]                                                                          NEGATIVE[ X]     PHYSICAL EXAM  Vital Signs Last 24 Hrs  T(C): 36.4 (14 Jan 2019 07:10), Max: 36.5 (13 Jan 2019 23:10)  T(F): 97.6 (14 Jan 2019 07:10), Max: 97.7 (13 Jan 2019 23:10)  HR: 61 (14 Jan 2019 07:08) (61 - 63)  BP: 120/71 (14 Jan 2019 07:08) (119/77 - 120/71)  BP(mean): 90 (14 Jan 2019 07:08) (90 - 93)  RR: 18 (14 Jan 2019 07:10) (18 - 20)  SpO2: 94% (14 Jan 2019 07:10) (93% - 98%)  Right arm bp: 122/68                                Left arm bp; 120/65    CONSTITUTIONAL:  WNL[x]   Neuro: WNL [ x] Normal exam oriented to person/place & time with no focal motor or sensory  deficits. Other                     Eyes:    WNL [x ] Normal exam of conjunctiva & lids, pupils equally reactive. Other     ENT:     WNL [ x] Normal exam of nasal/oral mucosa with absence of cyanosis. Other  Neck:   WNL [x ] Normal exam of jugular veins, trachea & thyroid. Other  Chest:  WNL [x ] Normal lung exam with good air movement absence of wheezes, rales, or rhonchi: Other                                                                                CV:  Auscultation: normal [x ] S3[ ] S4[ ] Irregular [ ] Rub[ ] Clicks[ ]    Murmurs none:[ x]systolic [ ]  diastolic [ ] holosystolic [ ]  Carotids: No Bruits[x ] Other                 Abdominal Aorta: normal [ ] nonpalpable[ ]Other                                                                                      GI: WNL[x ] Normal exam of abdomen, liver & spleen with no noted masses or tenderness. Other                                                                                                        Extremities: WNL[x ] Normal no evidence of cyanosis or deformity Edema: none[ ]trace[ ]1+[ ]2+[ ]3+[ ]4+[ ]  Lower Extremity Pulses: Right[x ] Left[x ]Varicosities[ ]  SKIN :WNL[x ] Normal exam to inspection & paplation. Other:                                                          LABS:                        15.0   5.98  )-----------( 197      ( 14 Jan 2019 07:08 )             43.3     01-14    142  |  104  |  15  ----------------------------<  124<H>  4.5   |  28  |  0.9    Ca    9.3      14 Jan 2019 07:08  Mg     2.1     01-14    TPro  6.6  /  Alb  4.1  /  TBili  1.5<H>  /  DBili  x   /  AST  18  /  ALT  17  /  AlkPhos  71  01-14    PT/INR - ( 14 Jan 2019 07:08 )   PT: 12.80 sec;   INR: 1.18 ratio       PTT - ( 14 Jan 2019 07:08 )  PTT:32.3 sec    CARDIAC MARKERS ( 14 Jan 2019 07:08 )  x     / 0.64 ng/mL / 280 U/L / x     / 10.2 ng/mL  CARDIAC MARKERS ( 13 Jan 2019 10:44 )  x     / 0.59 ng/mL / 267 U/L / x     / 12.8 ng/mL  CARDIAC MARKERS ( 12 Jan 2019 18:50 )  x     / 0.65 ng/mL / x     / x     / 21.1 ng/mL    Cardiac Cath: 3VCAD: LAD, Cirx, Diag. Pending official report     TTE: Pending    STS Score:  Isolated CAB  Risk of Mortality:  0.537%  Renal Failure:  0.345%  Permanent Stroke:  0.569%  Prolonged Ventilation:  2.392%  DSW Infection:  0.089%  Reoperation:  2.738%  Morbidity or Mortality:  4.335%  Short Length of Stay:  70.749%  Long Length of Stay:  1.244%    Impression:  CAD [ x]  Valvular  disease [ ]   Aortic Disease [ ]   SILVA: Yes[ ] No [ ]   CKD Stage I [ ] , Stage II [ ] , Stage III [ ], Stage IV [ ]   Anemia: Yes [ ], No [ ]  Diabetes :Yes [ ], No [ ]  Acute MI: Yes [ ], No [ ]   Heart Failure: Yes [ ] , No [ ] HFpEF [ ], HFrEF [ ]      Assessment/ Plan: 67 yo M with PMHx of depression/anxiety now with ACS/NSTEMI. Cardiac Cath revealed severe 3vCAD w/preserved EF.    -Cases and plan discussed with CT surgeon Dr. Drake. Initial STS risk assessed and discussed with patient. Evaluation by full heart team pending. Attending note to follow. Pre-op for: CABG     Recommendations:  [x] hold Plavix  [] hold ASA if Pre-op Cardiac Valve surgery and patient without CAD  [x] hold ACEI/ARB/CCB 24 hours prior to planned procedure   [x] LUE precaution for possible radial artery harvest      Labs:  [x] CBC  [x] CMP  [x] PT/INR/PTT  [x] BNP  [x] HgA1c  [x] Type and screen  [x] Urinalysis  [x] MRSA    Diagnostic studies  [] CT HEAD Nonn-Contrast  [x] CT Chest without contrast   [x] Carotid Duplex  [x] PFT: Simple PFT [x ]  Full [ ]  [] MARVIN/PVR  [x] TTE    Consultations/Evaluations   [] Renal Consult  [] Pulmonary Consult  [] Vascular Consult  [] Dental Consult   [] Hem-Onc Consult   [] GI Consult   [] Other Consultations :

## 2019-01-14 NOTE — PROGRESS NOTE ADULT - SUBJECTIVE AND OBJECTIVE BOX
Patient is a 68y old  Male who presents with a chief complaint of chest pain (14 Jan 2019 07:25)    PAST MEDICAL & SURGICAL HISTORY:  Depression  No significant past surgical history    MEDICATIONS  (STANDING):  aspirin  chewable 81 milliGRAM(s) Oral daily  atorvastatin 80 milliGRAM(s) Oral at bedtime  chlorhexidine 4% Liquid 1 Application(s) Topical <User Schedule>  clopidogrel Tablet 75 milliGRAM(s) Oral daily  enoxaparin Injectable 100 milliGRAM(s) SubCutaneous two times a day  influenza   Vaccine 0.5 milliLiter(s) IntraMuscular once  metoprolol succinate ER 25 milliGRAM(s) Oral daily  pantoprazole    Tablet 40 milliGRAM(s) Oral before breakfast  PARoxetine 20 milliGRAM(s) Oral daily    MEDICATIONS  (PRN):    Overnight events: no major events    Vital Signs Last 24 Hrs  T(C): 36.4 (14 Jan 2019 07:10), Max: 36.9 (13 Jan 2019 15:23)  T(F): 97.6 (14 Jan 2019 07:10), Max: 98.4 (13 Jan 2019 15:23)  HR: 61 (14 Jan 2019 07:08) (58 - 64)  BP: 120/71 (14 Jan 2019 07:08) (117/62 - 120/71)  BP(mean): 90 (14 Jan 2019 07:08) (83 - 93)  RR: 18 (14 Jan 2019 07:10) (18 - 20)  SpO2: 94% (14 Jan 2019 07:10) (93% - 98%)  CAPILLARY BLOOD GLUCOSE    I&O's Summary    13 Jan 2019 07:01  -  14 Jan 2019 07:00  --------------------------------------------------------  IN: 250 mL / OUT: 251 mL / NET: -1 mL    Physical Exam:    -     General : NAD  -      Cardiac: RRR, normal S1S2  -      Pulm: GBBS  -      GI: +ve Bowel sounds, soft non tender non distended abdomen, no organomegalies  -      Musculoskeletal: no joint or muscle pain  -      Neuro: AAOx3,Motor power grossly intact, intact sensorium,     Labs:                        15.0   5.98  )-----------( 197      ( 14 Jan 2019 07:08 )             43.3             01-14    142  |  104  |  15  ----------------------------<  124<H>  4.5   |  28  |  0.9    Ca    9.3      14 Jan 2019 07:08  Mg     2.1     01-14    TPro  6.6  /  Alb  4.1  /  TBili  1.5<H>  /  DBili  x   /  AST  18  /  ALT  17  /  AlkPhos  71  01-14    LIVER FUNCTIONS - ( 14 Jan 2019 07:08 )  Alb: 4.1 g/dL / Pro: 6.6 g/dL / ALK PHOS: 71 U/L / ALT: 17 U/L / AST: 18 U/L / GGT: x                 PT/INR - ( 14 Jan 2019 07:08 )   PT: 12.80 sec;   INR: 1.18 ratio       PTT - ( 14 Jan 2019 07:08 )  PTT:32.3 sec  CARDIAC MARKERS ( 14 Jan 2019 07:08 )  x     / 0.64 ng/mL / 280 U/L / x     / 10.2 ng/mL  CARDIAC MARKERS ( 13 Jan 2019 10:44 )  x     / 0.59 ng/mL / 267 U/L / x     / 12.8 ng/mL  CARDIAC MARKERS ( 12 Jan 2019 18:50 )  x     / 0.65 ng/mL / x     / x     / 21.1 ng/mL  CARDIAC MARKERS ( 12 Jan 2019 14:43 )  x     / 0.70 ng/mL / x     / x     / 27.3 ng/mL  CARDIAC MARKERS ( 12 Jan 2019 11:46 )  x     / 0.58 ng/mL / x     / x     / x        Imaging:  < from: Xray Chest 2 Views PA/Lat (01.12.19 @ 11:52) >  No radiographic evidence of acute cardiopulmonary disease.    ECG:  < from: 12 Lead ECG (01.13.19 @ 07:39) >  Diagnosis Line Normal sinus rhythm  ST & Marked T wave abnormality, consider anterolateral ischemia  Abnormal ECG    < end of copied text >  < from: 12 Lead ECG (01.13.19 @ 07:39) >  Diagnosis Line Normal sinus rhythm  ST & Marked T wave abnormality, consider anterolateral ischemia  Abnormal ECG    < end of copied text >

## 2019-01-14 NOTE — CONSULT NOTE ADULT - ASSESSMENT
CTS ATTENDING    Patient interviewed in the cath lab at the request of Dr. Raymond  Pt is referred for cabg due ti angina, 3-vessel coronary disease and preserved lv function  Ptient, son and wife interviewed together  Pocedure explained in detail, including risks, benefits and alternatives, and patient agreed to proceed with surgery  Tentatively scheduled for Wednesday, pending preop testing.

## 2019-01-14 NOTE — PROGRESS NOTE ADULT - ATTENDING COMMENTS
Patient seen and evaluated independently medical resident note reviewed, I agree with plan and management, except as I have noted. NPO for cardiac cath today

## 2019-01-15 LAB
ALBUMIN SERPL ELPH-MCNC: 3.8 G/DL — SIGNIFICANT CHANGE UP (ref 3.5–5.2)
ALP SERPL-CCNC: 70 U/L — SIGNIFICANT CHANGE UP (ref 30–115)
ALT FLD-CCNC: 20 U/L — SIGNIFICANT CHANGE UP (ref 0–41)
ANION GAP SERPL CALC-SCNC: 11 MMOL/L — SIGNIFICANT CHANGE UP (ref 7–14)
ANION GAP SERPL CALC-SCNC: 19 MMOL/L — HIGH (ref 7–14)
APPEARANCE UR: CLEAR — SIGNIFICANT CHANGE UP
APTT BLD: 30.7 SEC — SIGNIFICANT CHANGE UP (ref 27–39.2)
APTT BLD: 38.3 SEC — SIGNIFICANT CHANGE UP (ref 27–39.2)
AST SERPL-CCNC: 21 U/L — SIGNIFICANT CHANGE UP (ref 0–41)
BASOPHILS # BLD AUTO: 0.04 K/UL — SIGNIFICANT CHANGE UP (ref 0–0.2)
BASOPHILS NFR BLD AUTO: 0.6 % — SIGNIFICANT CHANGE UP (ref 0–1)
BILIRUB SERPL-MCNC: 1.1 MG/DL — SIGNIFICANT CHANGE UP (ref 0.2–1.2)
BILIRUB UR-MCNC: ABNORMAL
BLD GP AB SCN SERPL QL: SIGNIFICANT CHANGE UP
BUN SERPL-MCNC: 17 MG/DL — SIGNIFICANT CHANGE UP (ref 10–20)
BUN SERPL-MCNC: 18 MG/DL — SIGNIFICANT CHANGE UP (ref 10–20)
CALCIUM SERPL-MCNC: 9.2 MG/DL — SIGNIFICANT CHANGE UP (ref 8.5–10.1)
CALCIUM SERPL-MCNC: 9.6 MG/DL — SIGNIFICANT CHANGE UP (ref 8.5–10.1)
CHLORIDE SERPL-SCNC: 103 MMOL/L — SIGNIFICANT CHANGE UP (ref 98–110)
CHLORIDE SERPL-SCNC: 103 MMOL/L — SIGNIFICANT CHANGE UP (ref 98–110)
CO2 SERPL-SCNC: 22 MMOL/L — SIGNIFICANT CHANGE UP (ref 17–32)
CO2 SERPL-SCNC: 26 MMOL/L — SIGNIFICANT CHANGE UP (ref 17–32)
COLOR SPEC: YELLOW — SIGNIFICANT CHANGE UP
CREAT SERPL-MCNC: 0.9 MG/DL — SIGNIFICANT CHANGE UP (ref 0.7–1.5)
CREAT SERPL-MCNC: 1 MG/DL — SIGNIFICANT CHANGE UP (ref 0.7–1.5)
DIFF PNL FLD: NEGATIVE — SIGNIFICANT CHANGE UP
EOSINOPHIL # BLD AUTO: 0.16 K/UL — SIGNIFICANT CHANGE UP (ref 0–0.7)
EOSINOPHIL NFR BLD AUTO: 2.6 % — SIGNIFICANT CHANGE UP (ref 0–8)
GLUCOSE BLDC GLUCOMTR-MCNC: 118 MG/DL — HIGH (ref 70–99)
GLUCOSE SERPL-MCNC: 131 MG/DL — HIGH (ref 70–99)
GLUCOSE SERPL-MCNC: 134 MG/DL — HIGH (ref 70–99)
GLUCOSE UR QL: >=1000
HCT VFR BLD CALC: 41.7 % — LOW (ref 42–52)
HCT VFR BLD CALC: 45 % — SIGNIFICANT CHANGE UP (ref 42–52)
HGB BLD-MCNC: 14.3 G/DL — SIGNIFICANT CHANGE UP (ref 14–18)
HGB BLD-MCNC: 15.7 G/DL — SIGNIFICANT CHANGE UP (ref 14–18)
IMM GRANULOCYTES NFR BLD AUTO: 0.2 % — SIGNIFICANT CHANGE UP (ref 0.1–0.3)
INR BLD: 1.08 RATIO — SIGNIFICANT CHANGE UP (ref 0.65–1.3)
INR BLD: 1.1 RATIO — SIGNIFICANT CHANGE UP (ref 0.65–1.3)
KETONES UR-MCNC: NEGATIVE — SIGNIFICANT CHANGE UP
LEUKOCYTE ESTERASE UR-ACNC: NEGATIVE — SIGNIFICANT CHANGE UP
LYMPHOCYTES # BLD AUTO: 1.72 K/UL — SIGNIFICANT CHANGE UP (ref 1.2–3.4)
LYMPHOCYTES # BLD AUTO: 27.7 % — SIGNIFICANT CHANGE UP (ref 20.5–51.1)
MAGNESIUM SERPL-MCNC: 2.1 MG/DL — SIGNIFICANT CHANGE UP (ref 1.8–2.4)
MAGNESIUM SERPL-MCNC: 2.2 MG/DL — SIGNIFICANT CHANGE UP (ref 1.8–2.4)
MCHC RBC-ENTMCNC: 28.5 PG — SIGNIFICANT CHANGE UP (ref 27–31)
MCHC RBC-ENTMCNC: 29 PG — SIGNIFICANT CHANGE UP (ref 27–31)
MCHC RBC-ENTMCNC: 34.3 G/DL — SIGNIFICANT CHANGE UP (ref 32–37)
MCHC RBC-ENTMCNC: 34.9 G/DL — SIGNIFICANT CHANGE UP (ref 32–37)
MCV RBC AUTO: 83 FL — SIGNIFICANT CHANGE UP (ref 80–94)
MCV RBC AUTO: 83.1 FL — SIGNIFICANT CHANGE UP (ref 80–94)
MONOCYTES # BLD AUTO: 0.44 K/UL — SIGNIFICANT CHANGE UP (ref 0.1–0.6)
MONOCYTES NFR BLD AUTO: 7.1 % — SIGNIFICANT CHANGE UP (ref 1.7–9.3)
MRSA PCR RESULT.: NEGATIVE — SIGNIFICANT CHANGE UP
NEUTROPHILS # BLD AUTO: 3.84 K/UL — SIGNIFICANT CHANGE UP (ref 1.4–6.5)
NEUTROPHILS NFR BLD AUTO: 61.8 % — SIGNIFICANT CHANGE UP (ref 42.2–75.2)
NITRITE UR-MCNC: NEGATIVE — SIGNIFICANT CHANGE UP
NRBC # BLD: 0 /100 WBCS — SIGNIFICANT CHANGE UP (ref 0–0)
NT-PROBNP SERPL-SCNC: 243 PG/ML — SIGNIFICANT CHANGE UP (ref 0–300)
PH UR: 6 — SIGNIFICANT CHANGE UP (ref 5–8)
PLATELET # BLD AUTO: 194 K/UL — SIGNIFICANT CHANGE UP (ref 130–400)
PLATELET # BLD AUTO: 229 K/UL — SIGNIFICANT CHANGE UP (ref 130–400)
POTASSIUM SERPL-MCNC: 4.4 MMOL/L — SIGNIFICANT CHANGE UP (ref 3.5–5)
POTASSIUM SERPL-MCNC: 4.5 MMOL/L — SIGNIFICANT CHANGE UP (ref 3.5–5)
POTASSIUM SERPL-SCNC: 4.4 MMOL/L — SIGNIFICANT CHANGE UP (ref 3.5–5)
POTASSIUM SERPL-SCNC: 4.5 MMOL/L — SIGNIFICANT CHANGE UP (ref 3.5–5)
PROT SERPL-MCNC: 6.1 G/DL — SIGNIFICANT CHANGE UP (ref 6–8)
PROT UR-MCNC: ABNORMAL
PROTHROM AB SERPL-ACNC: 12.4 SEC — SIGNIFICANT CHANGE UP (ref 9.95–12.87)
PROTHROM AB SERPL-ACNC: 12.6 SEC — SIGNIFICANT CHANGE UP (ref 9.95–12.87)
RBC # BLD: 5.02 M/UL — SIGNIFICANT CHANGE UP (ref 4.7–6.1)
RBC # BLD: 5.42 M/UL — SIGNIFICANT CHANGE UP (ref 4.7–6.1)
RBC # FLD: 11.9 % — SIGNIFICANT CHANGE UP (ref 11.5–14.5)
RBC # FLD: 12.1 % — SIGNIFICANT CHANGE UP (ref 11.5–14.5)
RBC CASTS # UR COMP ASSIST: SIGNIFICANT CHANGE UP /HPF
SODIUM SERPL-SCNC: 140 MMOL/L — SIGNIFICANT CHANGE UP (ref 135–146)
SODIUM SERPL-SCNC: 144 MMOL/L — SIGNIFICANT CHANGE UP (ref 135–146)
SP GR SPEC: >=1.03 — SIGNIFICANT CHANGE UP (ref 1.01–1.03)
TROPONIN T SERPL-MCNC: 0.38 NG/ML — CRITICAL HIGH
TYPE + AB SCN PNL BLD: SIGNIFICANT CHANGE UP
UROBILINOGEN FLD QL: 1 (ref 0.2–0.2)
WBC # BLD: 5.91 K/UL — SIGNIFICANT CHANGE UP (ref 4.8–10.8)
WBC # BLD: 6.21 K/UL — SIGNIFICANT CHANGE UP (ref 4.8–10.8)
WBC # FLD AUTO: 5.91 K/UL — SIGNIFICANT CHANGE UP (ref 4.8–10.8)
WBC # FLD AUTO: 6.21 K/UL — SIGNIFICANT CHANGE UP (ref 4.8–10.8)

## 2019-01-15 RX ORDER — METOPROLOL TARTRATE 50 MG
25 TABLET ORAL DAILY
Qty: 0 | Refills: 0 | Status: DISCONTINUED | OUTPATIENT
Start: 2019-01-15 | End: 2019-01-16

## 2019-01-15 RX ORDER — CHLORHEXIDINE GLUCONATE 213 G/1000ML
15 SOLUTION TOPICAL ONCE
Qty: 0 | Refills: 0 | Status: COMPLETED | OUTPATIENT
Start: 2019-01-15 | End: 2019-01-15

## 2019-01-15 RX ORDER — CHLORHEXIDINE GLUCONATE 213 G/1000ML
1 SOLUTION TOPICAL ONCE
Qty: 0 | Refills: 0 | Status: COMPLETED | OUTPATIENT
Start: 2019-01-15 | End: 2019-01-15

## 2019-01-15 RX ADMIN — ATORVASTATIN CALCIUM 80 MILLIGRAM(S): 80 TABLET, FILM COATED ORAL at 21:19

## 2019-01-15 RX ADMIN — PANTOPRAZOLE SODIUM 40 MILLIGRAM(S): 20 TABLET, DELAYED RELEASE ORAL at 06:52

## 2019-01-15 RX ADMIN — ENOXAPARIN SODIUM 100 MILLIGRAM(S): 100 INJECTION SUBCUTANEOUS at 17:09

## 2019-01-15 RX ADMIN — ENOXAPARIN SODIUM 100 MILLIGRAM(S): 100 INJECTION SUBCUTANEOUS at 06:52

## 2019-01-15 RX ADMIN — CHLORHEXIDINE GLUCONATE 15 MILLILITER(S): 213 SOLUTION TOPICAL at 21:19

## 2019-01-15 RX ADMIN — CHLORHEXIDINE GLUCONATE 1 APPLICATION(S): 213 SOLUTION TOPICAL at 20:00

## 2019-01-15 RX ADMIN — Medication 25 MILLIGRAM(S): at 12:11

## 2019-01-15 RX ADMIN — Medication 81 MILLIGRAM(S): at 12:11

## 2019-01-15 RX ADMIN — Medication 20 MILLIGRAM(S): at 12:11

## 2019-01-15 NOTE — PRE-ANESTHESIA EVALUATION ADULT - NSANTHPEFT_GEN_ALL_CORE
Gen: aaox3, nad  Pulm: CTA b/l w/ no w/r/r  Cardio: RRR w/ no murmurs/extra soudns  Abd: bs present, soft, nd/nt

## 2019-01-15 NOTE — PROGRESS NOTE ADULT - SUBJECTIVE AND OBJECTIVE BOX
PLANNED OPERATIVE PROCEDURE(s):                HD #   2                    SURGEON(s): JULIA Cazares  SUBJECTIVE ASSESSMENT: 68y Male patient seen and examined at bedside.    Vital Signs Last 24 Hrs  T(F): 97.5 (15 Mg 2019 08:00), Max: 98 (15 Mg 2019 00:00)  HR: 60 (15 Mg 2019 08:00) (56 - 68)  BP: 114/74 (15 Mg 2019 08:00) (91/59 - 119/73)  BP(mean): 85 (15 Mg 2019 08:00) (68 - 88)  RR: 17 (15 Mg 2019 08:00) (14 - 20)  SpO2: 94% (15 Mg 2019 08:00) (94% - 96%)    I&O's Detail    14 Jan 2019 07:01  -  15 Mg 2019 07:00  --------------------------------------------------------  IN:  Total IN: 0 mL    OUT:    Voided: 250 mL  Total OUT: 250 mL    Net: I&O's Detail    13 Jan 2019 07:01  -  14 Jan 2019 07:00  --------------------------------------------------------  Total NET: -1 mL    14 Jan 2019 07:01  -  15 Mg 2019 07:00  --------------------------------------------------------  Total NET: -250 mL      CAPILLARY BLOOD GLUCOSE  POCT Blood Glucose.: 115 mg/dL (14 Jan 2019 22:06)      Physical Exam:  General: NAD; A&Ox3  Cardiac: S1/S2, RRR, no murmur, no rubs  Lungs: unlabored respirations, CTA b/l, no wheeze, no rales, no crackles  Abdomen: Soft/NT/ND; positive bowel sounds x 4  Extremities: No edema b/l lower extremities; good capillary refill; no cyanosis; palpable 1+ pedal pulses b/l    BOWEL MOVEMENT:  [x] YES [] NO, If No, Timing since last BM Day #        LABS:                        14.3   6.21  )-----------( 194      ( 15 Mg 2019 06:08 )             41.7<L>                        15.0   5.98  )-----------( 197      ( 14 Jan 2019 07:08 )             43.3     01-15  144  |  103  |  18  ----------------------------<  131<H>  4.4   |  22  |  1.0    01-14  142  |  104  |  15  ----------------------------<  124<H>  4.5   |  28  |  0.9    Ca    9.2      15 Mg 2019 06:08  Mg     2.1     01-15    TPro  6.1 [6.0 - 8.0]  /  Alb  3.8 [3.5 - 5.2]  /  TBili  1.1 [0.2 - 1.2]  /  DBili  x   /  AST  21 [0 - 41]  /  ALT  20 [0 - 41]  /  AlkPhos  70 [30 - 115]  01-15    PT/INR - ( 15 Mg 2019 06:08 )   PT: ;   INR: 1.10 ratio       PT/INR - ( 14 Jan 2019 07:08 )   PT: ;   INR: 1.18 ratio       PTT - ( 15 Mg 2019 06:08 )  PTT:30.7 sec, PTT - ( 14 Jan 2019 07:08 )  PTT:32.3 sec      RADIOLOGY & ADDITIONAL TESTS:  CXR: < from: Xray Chest 2 Views PA/Lat (01.12.19 @ 11:52) >  Impression:  No radiographic evidence of acute cardiopulmonary disease.  < end of copied text >     EKG: < from: 12 Lead ECG (01.15.19 @ 07:24) >  Ventricular Rate 59 BPM  Atrial Rate 59 BPM  P-R Interval 158 ms  QRS Duration 84 ms  Q-T Interval 424 ms  QTC Calculation(Bezet) 419 ms  P Axis 34 degrees  R Axis -18 degrees  T Axis 18 degrees  Diagnosis Line Sinus bradycardia  ST & T wave abnormality, consider anterolateral ischemia  Abnormal ECG  Confirmed by SILVANA RAMIREZ MD ([id number]) on 1/15/2019 9:01:54 AM  < end of copied text >    Allergies    cats (Unknown)  No Known Drug Allergies    Intolerances      MEDICATIONS  (STANDING):  aspirin  chewable 81 milliGRAM(s) Oral daily  atorvastatin 80 milliGRAM(s) Oral at bedtime  chlorhexidine 4% Liquid 1 Application(s) Topical <User Schedule>  enoxaparin Injectable 100 milliGRAM(s) SubCutaneous two times a day  metoprolol succinate ER 25 milliGRAM(s) Oral daily  pantoprazole    Tablet 40 milliGRAM(s) Oral before breakfast  PARoxetine 20 milliGRAM(s) Oral daily  sodium chloride 0.9%. 400 milliLiter(s) (100 mL/Hr) IV Continuous <Continuous>    Pre-op ACEi/ARB/CCB held 24 hours prior to planned procedure: [x] Yes, [] NO: indication:  Pre-Op Beta-Blockers: [x]Yes, []No: contraindication:  Pre-Op Aspirin: [x]Yes,  []No: contraindication: [] Held for Pre-op cardiac valve surgery with no CAD  Pre-Op Statin: [x]Yes, []No: contraindication:    Cardiac Surgery Risk Factors  CVA and/or carotid/cerebrovascular disease. Yes  No  Explain if Yes  Aortoiliac disease Yes  No  Explain if Yes  Previous MI Yes  No  Explain if Yes  Previos Cardiac Surgery Yes  No  Explain if Yes  Hemodynamics-Unstable or Shock Yes  No  Explain if Yes  Diabetis Yes  No  Explain if Yes  Hepatic Failure Yes  No  Explain if Yes  Renal failure and/or dialysis Yes  No  Explain if Yes  Heart failure-type-present or past Yes  No  Explain if Yes  COPD Yes  No  Explain if Yes  Immune System Deficiency Yes  No  Explain if Yes  Malignant Ventricular Arrhythmia Yes  No  Explain if Yes    STS Score:     Pt has AICD/PPM [ ] Yes  [x ] No             Brand Name:  Pre-op Beta Blocker ordered within 24 hrs of surgery (CABG ONLY)?  [x ] Yes  [ ] No  If not, Why?  Type & Cross  [ ] Yes  [ ] No  NPO after Midnight [x ] Yes  [ ] No  Pre-op ABX ordered, to be taped on chart:  [ x] Yes  [ ] No     Hibiclens/Peridex ordered [x ] Yes  [ ] No  Intraop on Hold: PRBCs, CXR, HECTOR [x ]   Consent obtained  [x ] Yes  [ ] No  Assessment/Plan:  68y Male Pre-op for   - Case and plan discussed with CTU Intensivist and CT Surgeon - Dr. Nikc/Vidal/Jeanette   - Continue CTU supportive care and ongoing plan of care as per continuing CTU rounds.    - Continue DVT/GI prophylaxis  - Incentive Spirometry 10 times an hour  - Continue to advance physical activity as tolerated and continue PT/OT as directed  1. CAD: Continue ASA, statin, BB  2. HTN:   3. A. Fib:   4. COPD/Hypoxia:   5. DM/Glucose Control:     Social Service Disposition:      Cardiac Surgery Pre-op Note:  CC: Patient is a 68y old  Male who presents with a chief complaint of chest pain (14 Jan 2019 17:08)      Referring Physician:                                                                                             Surgeon:  Procedure: (Date) (Procedure)    Allergies    cats (Unknown)  No Known Drug Allergies    Intolerances      HPI:  67 yo M with PMHx of depression/ anxiety on Paxil, presents to ED with complaint of intermittent chest pain for 1 week's duration. Pt states symptoms are worse at night. Denies fever/chills, cough, hemoptysis, URI symptoms, abdominal pain, n/v/d, back pain, numbness, tingling, weakness, family hx of cardiac disease, hx of DVT/PE, recent sx, recent travel, trauma/injury. Chest pain described as sharp, intermittent, nonradiating, occurs at rest, occurs randomly, not worsened with exertion. Never had stress test, never smoker. No family history of cardiac disease. (13 Jan 2019 08:23)        PAST MEDICAL & SURGICAL HISTORY:  Depression  No significant past surgical history      MEDICATIONS  (STANDING):  aspirin  chewable 81 milliGRAM(s) Oral daily  atorvastatin 80 milliGRAM(s) Oral at bedtime  chlorhexidine 4% Liquid 1 Application(s) Topical <User Schedule>  enoxaparin Injectable 100 milliGRAM(s) SubCutaneous two times a day  metoprolol succinate ER 25 milliGRAM(s) Oral daily  pantoprazole    Tablet 40 milliGRAM(s) Oral before breakfast  PARoxetine 20 milliGRAM(s) Oral daily  sodium chloride 0.9%. 400 milliLiter(s) (100 mL/Hr) IV Continuous <Continuous>    MEDICATIONS  (PRN):      T(F): 97.5 (01-15-19 @ 08:00), Max: 98 (01-15-19 @ 00:00)  HR: 60 (01-15-19 @ 08:00) (56 - 68)  BP: 114/74 (01-15-19 @ 08:00) (91/59 - 119/73)  ABP: --  RR: 17 (01-15-19 @ 08:00) (14 - 20)  SpO2: 94% (01-15-19 @ 08:00) (94% - 96%)  CVP(mm Hg): --  CVP(cm H2O): --  CO: --  CI: --  PA: --  SVR: --    01-14-19 @ 07:01  -  01-15-19 @ 07:00  --------------------------------------------------------  IN:  Total IN: 0 mL    OUT:    Voided: 250 mL  Total OUT: 250 mL    Total NET: -250 mL      01-15-19 @ 07:01  -  01-15-19 @ 10:24  --------------------------------------------------------  IN:    Oral Fluid: 360 mL  Total IN: 360 mL    OUT:  Total OUT: 0 mL    Total NET: 360 mL        RUE BP:                        LUE BP:    Labs:                        14.3   6.21  )-----------( 194      ( 15 Mg 2019 06:08 )             41.7     01-15    144  |  103  |  18  ----------------------------<  131<H>  4.4   |  22  |  1.0    Ca    9.2      15 Mg 2019 06:08  Mg     2.1     01-15    TPro  6.1  /  Alb  3.8  /  TBili  1.1  /  DBili  x   /  AST  21  /  ALT  20  /  AlkPhos  70  01-15    PT/INR - ( 15 Mg 2019 06:08 )   PT: 12.60 sec;   INR: 1.10 ratio         PTT - ( 15 Mg 2019 06:08 )  PTT:30.7 sec    Blood Type:   HGB A1C: Hemoglobin A1C, Whole Blood: 6.4 % (01-13 @ 06:35)    Prealbumin:   Pro-BNP:   Thyroid Panel:   MRSA:  / MSSA:       CXR:     EKG:    Carotid Duplex:      PFT's:    Echocardiogram:     Cardiac catheterization:    Gen: WN/WD NAD  Neuro: A&Ox3, nonfocal  Pulm: CTA B/L  CV: RRR, S1S2 +systolic murmur  Abd: Soft, NT, ND +BS  Ext: No edema, + peripheral pulses  5meter walk test: T1:      s/T2:     s/T3:     s Cardiac Surgery Pre-op Note: CABG  CC: Patient is a 68y old  Male who presents with a chief complaint of chest pain (14 Jan 2019 17:08)    Referring Physician:  Dr. Raymond                                                                                           Surgeon: Dr. Priyank Drake  PMD: Dr. Vu    HPI:  69 yo M with PMHx of depression/anxiety on Paxil, presents to ED with complaints of intermittent chest pain for 1 week duration. Pt states symptoms are worse at night and occasionally occurs at rest. Denies fever/chills, cough, hemoptysis, URI symptoms, abdominal pain, n/v/d, back pain, numbness, tingling, weakness, family hx of cardiac disease, hx of DVT/PE, recent sx, recent travel, trauma/injury. Chest pain described as sharp, intermittent, non-radiating, occurs at rest, occurs randomly, not worsened with exertion. Never had stress test, never smoker. No family history of cardiac disease. (13 Jan 2019 08:23). Patient was evaluated by cardiology determined to be in ACS and NSTEMI in ED. Pt transferred to [place] for cardiac cath which revealed 3v CAD w/preserved EF.    PAST MEDICAL & SURGICAL HISTORY:  Depression  No significant past surgical history    SUBJECTIVE ASSESSMENT: 68y Male patient seen and examined at bedside. Patient denies any acute complaints at this time.     Vital Signs Last 24 Hrs  T(F): 97.5 (15 Mg 2019 08:00), Max: 98 (15 Mg 2019 00:00)  HR: 60 (15 Mg 2019 08:00) (56 - 68)  BP: 114/74 (15 Mg 2019 08:00) (91/59 - 119/73)  Right arm bp: 122/68                                Left arm bp; 120/65  RR: 17 (15 Gm 2019 08:00) (14 - 20)  SpO2: 94% (15 Mg 2019 08:00) (94% - 96%)    I&O's Detail    14 Jan 2019 07:01  -  15 Mg 2019 07:00  --------------------------------------------------------  IN:  Total IN: 0 mL    OUT:    Voided: 250 mL  Total OUT: 250 mL    Net: I&O's Detail    13 Jan 2019 07:01  -  14 Jan 2019 07:00  --------------------------------------------------------  Total NET: -1 mL    14 Jan 2019 07:01  -  15 Mg 2019 07:00  --------------------------------------------------------  Total NET: -250 mL      CAPILLARY BLOOD GLUCOSE  POCT Blood Glucose.: 115 mg/dL (14 Jan 2019 22:06)      Physical Exam:  General: NAD; A&Ox3  Cardiac: S1/S2, RRR, no murmur, no rubs  Lungs: unlabored respirations, CTA b/l, no wheeze, no rales, no crackles  Abdomen: Soft/NT/ND; positive bowel sounds x 4  Extremities: No edema b/l lower extremities; good capillary refill; no cyanosis; palpable 1+ pedal pulses b/l    5 meter walk test: 1_5___sec, 2_5___sec, 3__5_sec        BOWEL MOVEMENT:  [x] YES [] NO, If No, Timing since last BM Day #        LABS:                        14.3   6.21  )-----------( 194      ( 15 Mg 2019 06:08 )             41.7<L>                        15.0   5.98  )-----------( 197      ( 14 Jan 2019 07:08 )             43.3     01-15  144  |  103  |  18  ----------------------------<  131<H>  4.4   |  22  |  1.0    01-14  142  |  104  |  15  ----------------------------<  124<H>  4.5   |  28  |  0.9    Ca    9.2      15 Mg 2019 06:08  Mg     2.1     01-15    TPro  6.1 [6.0 - 8.0]  /  Alb  3.8 [3.5 - 5.2]  /  TBili  1.1 [0.2 - 1.2]  /  DBili  x   /  AST  21 [0 - 41]  /  ALT  20 [0 - 41]  /  AlkPhos  70 [30 - 115]  01-15    PT/INR - ( 15 Mg 2019 06:08 )   PT: ;   INR: 1.10 ratio       PT/INR - ( 14 Jan 2019 07:08 )   PT: ;   INR: 1.18 ratio       PTT - ( 15 Mg 2019 06:08 )  PTT:30.7 sec, PTT - ( 14 Jan 2019 07:08 )  PTT:32.3 sec    CARDIAC MARKERS ( 14 Jan 2019 07:08 )  x     / 0.64 ng/mL / 280 U/L / x     / 10.2 ng/mL  CARDIAC MARKERS ( 13 Jan 2019 10:44 )  x     / 0.59 ng/mL / 267 U/L / x     / 12.8 ng/mL  CARDIAC MARKERS ( 12 Jan 2019 18:50 )  x     / 0.65 ng/mL / x     / x     / 21.1 ng/mL    HGB A1C: Hemoglobin A1C, Whole Blood: 6.4 % (01-13 @ 06:35)    Type + Screen (01.13.19 @ 06:35)    Type + Screen: B POS      RADIOLOGY & ADDITIONAL TESTS:  CXR: < from: Xray Chest 2 Views PA/Lat (01.12.19 @ 11:52) >  Impression:  No radiographic evidence of acute cardiopulmonary disease.  < end of copied text >     EKG: < from: 12 Lead ECG (01.15.19 @ 07:24) >  Ventricular Rate 59 BPM  Atrial Rate 59 BPM  P-R Interval 158 ms  QRS Duration 84 ms  Q-T Interval 424 ms  QTC Calculation(Bezet) 419 ms  P Axis 34 degrees  R Axis -18 degrees  T Axis 18 degrees  Diagnosis Line Sinus bradycardia  ST & T wave abnormality, consider anterolateral ischemia  Abnormal ECG  Confirmed by SILVANA RAMIREZ MD ([id number]) on 1/15/2019 9:01:54 AM  < end of copied text >    Carotid Duplex:  < from: VA Duplex Carotid, Bilat (01.14.19 @ 22:46) >  Impression:  40-59% stenosis of the right internal carotid artery.  20-39% stenosis of the left internal carotid artery.  < end of copied text >    PFT's:    Echocardiogram:     Cardiac catheterization:     Allergies  cats (Unknown)  No Known Drug Allergies  Intolerances      MEDICATIONS  (STANDING):  aspirin  chewable 81 milliGRAM(s) Oral daily  atorvastatin 80 milliGRAM(s) Oral at bedtime  chlorhexidine 4% Liquid 1 Application(s) Topical <User Schedule>  enoxaparin Injectable 100 milliGRAM(s) SubCutaneous two times a day  metoprolol succinate ER 25 milliGRAM(s) Oral daily  pantoprazole    Tablet 40 milliGRAM(s) Oral before breakfast  PARoxetine 20 milliGRAM(s) Oral daily  sodium chloride 0.9%. 400 milliLiter(s) (100 mL/Hr) IV Continuous <Continuous>    Pre-op ACEi/ARB/CCB held 24 hours prior to planned procedure: [x] Yes, [] NO: indication:  Pre-Op Beta-Blockers: [x]Yes, []No: contraindication:  Pre-Op Aspirin: [x]Yes,  []No: contraindication: [] Held for Pre-op cardiac valve surgery with no CAD  Pre-Op Statin: [x]Yes, []No: contraindication:    Cardiac Surgery Risk Factors  CVA and/or carotid/cerebrovascular disease. Yes  No  Explain if Yes  Aortoiliac disease Yes  No  Explain if Yes  Previous MI Yes  No  Explain if Yes  Previos Cardiac Surgery Yes  No  Explain if Yes  Hemodynamics-Unstable or Shock Yes  No  Explain if Yes  diabetes Yes  No  Explain if Yes  Hepatic Failure Yes  No  Explain if Yes  Renal failure and/or dialysis Yes  No  Explain if Yes  Heart failure-type-present or past Yes  No  Explain if Yes  COPD Yes  No  Explain if Yes  Immune System Deficiency Yes  No  Explain if Yes  Malignant Ventricular Arrhythmia Yes  No  Explain if Yes    STS Score:     Pt has AICD/PPM [ ] Yes  [x ] No             Brand Name:  Pre-op Beta Blocker ordered within 24 hrs of surgery (CABG ONLY)?  [x ] Yes  [ ] No  If not, Why?  Type & Cross  [ ] Yes  [ ] No  NPO after Midnight [x ] Yes  [ ] No  Pre-op ABX ordered, to be taped on chart:  [ x] Yes  [ ] No     Hibiclens/Peridex ordered [x ] Yes  [ ] No  Intraop on Hold: PRBCs, CXR, HECTOR [x ]   Consent obtained  [x ] Yes  [ ] No    Assessment/Plan:  69 yo M with PMHx of depression/anxiety now with ACS/NSTEMI. Cardiac Cath revealed severe 3vCAD w/preserved EF.    - Case and plan discussed with CT Surgeon - Dr. Drake  - Continue CTU supportive care and ongoing plan of care as per continuing CTU rounds.    - Continue DVT/GI prophylaxis  - Incentive Spirometry 10 times an hour  - Continue to advance physical activity as tolerated and continue PT/OT as directed  1. CAD: Continue ASA, statin, BB  2. NPO after midnight for planned CABG  3. Hold PM dose of BB due to bradycardia   4. Hold pm dose of lovenox Cardiac Surgery Pre-op Note: CABG  CC: Patient is a 68y old  Male who presents with a chief complaint of chest pain (14 Jan 2019 17:08)    Referring Physician:  Dr. Raymond                                                                                           Surgeon: Dr. Priyank Drake  PMD: Dr. Vu    HPI:  67 yo M with PMHx of depression/anxiety on Paxil, presents to ED with complaints of intermittent chest pain for 1 week duration. Pt states symptoms are worse at night and occasionally occurs at rest. Denies fever/chills, cough, hemoptysis, URI symptoms, abdominal pain, n/v/d, back pain, numbness, tingling, weakness, family hx of cardiac disease, hx of DVT/PE, recent sx, recent travel, trauma/injury. Chest pain described as sharp, intermittent, non-radiating, occurs at rest, occurs randomly, not worsened with exertion. Never had stress test, never smoker. No family history of cardiac disease. (13 Jan 2019 08:23). Patient was evaluated by cardiology determined to be in ACS and NSTEMI in ED. Pt transferred to [place] for cardiac cath which revealed 3v CAD w/preserved EF.    PAST MEDICAL & SURGICAL HISTORY:  Depression  No significant past surgical history    SUBJECTIVE ASSESSMENT: 68y Male patient seen and examined at bedside. Patient denies any acute complaints at this time.     Vital Signs Last 24 Hrs  T(F): 97.5 (15 Mg 2019 08:00), Max: 98 (15 Mg 2019 00:00)  HR: 60 (15 Mg 2019 08:00) (56 - 68)  BP: 114/74 (15 Mg 2019 08:00) (91/59 - 119/73)  Right arm bp: 122/68                                Left arm bp; 120/65  RR: 17 (15 Mg 2019 08:00) (14 - 20)  SpO2: 94% (15 Mg 2019 08:00) (94% - 96%)    I&O's Detail    14 Jan 2019 07:01  -  15 Mg 2019 07:00  --------------------------------------------------------  IN:  Total IN: 0 mL    OUT:    Voided: 250 mL  Total OUT: 250 mL    Net: I&O's Detail    13 Jan 2019 07:01  -  14 Jan 2019 07:00  --------------------------------------------------------  Total NET: -1 mL    14 Jan 2019 07:01  -  15 Mg 2019 07:00  --------------------------------------------------------  Total NET: -250 mL      CAPILLARY BLOOD GLUCOSE  POCT Blood Glucose.: 115 mg/dL (14 Jan 2019 22:06)      Physical Exam:  General: NAD; A&Ox3  Cardiac: S1/S2, RRR, no murmur, no rubs  Lungs: unlabored respirations, CTA b/l, no wheeze, no rales, no crackles  Abdomen: Soft/NT/ND; positive bowel sounds x 4  Extremities: No edema b/l lower extremities; good capillary refill; no cyanosis; palpable 1+ pedal pulses b/l    5 meter walk test: 1_5___sec, 2_5___sec, 3__5_sec        BOWEL MOVEMENT:  [x] YES [] NO, If No, Timing since last BM Day #        LABS:                        14.3   6.21  )-----------( 194      ( 15 Mg 2019 06:08 )             41.7<L>                        15.0   5.98  )-----------( 197      ( 14 Jan 2019 07:08 )             43.3     01-15  144  |  103  |  18  ----------------------------<  131<H>  4.4   |  22  |  1.0    01-14  142  |  104  |  15  ----------------------------<  124<H>  4.5   |  28  |  0.9    Ca    9.2      15 Mg 2019 06:08  Mg     2.1     01-15    TPro  6.1 [6.0 - 8.0]  /  Alb  3.8 [3.5 - 5.2]  /  TBili  1.1 [0.2 - 1.2]  /  DBili  x   /  AST  21 [0 - 41]  /  ALT  20 [0 - 41]  /  AlkPhos  70 [30 - 115]  01-15    PT/INR - ( 15 Mg 2019 06:08 )   PT: ;   INR: 1.10 ratio       PT/INR - ( 14 Jan 2019 07:08 )   PT: ;   INR: 1.18 ratio       PTT - ( 15 Mg 2019 06:08 )  PTT:30.7 sec, PTT - ( 14 Jan 2019 07:08 )  PTT:32.3 sec    CARDIAC MARKERS ( 14 Jan 2019 07:08 )  x     / 0.64 ng/mL / 280 U/L / x     / 10.2 ng/mL  CARDIAC MARKERS ( 13 Jan 2019 10:44 )  x     / 0.59 ng/mL / 267 U/L / x     / 12.8 ng/mL  CARDIAC MARKERS ( 12 Jan 2019 18:50 )  x     / 0.65 ng/mL / x     / x     / 21.1 ng/mL    HGB A1C: Hemoglobin A1C, Whole Blood: 6.4 % (01-13 @ 06:35)    Type + Screen (01.13.19 @ 06:35)    Type + Screen: B POS      RADIOLOGY & ADDITIONAL TESTS:  CXR: < from: Xray Chest 2 Views PA/Lat (01.12.19 @ 11:52) >  Impression:  No radiographic evidence of acute cardiopulmonary disease.  < end of copied text >     EKG: < from: 12 Lead ECG (01.15.19 @ 07:24) >  Ventricular Rate 59 BPM  Atrial Rate 59 BPM  P-R Interval 158 ms  QRS Duration 84 ms  Q-T Interval 424 ms  QTC Calculation(Bezet) 419 ms  P Axis 34 degrees  R Axis -18 degrees  T Axis 18 degrees  Diagnosis Line Sinus bradycardia  ST & T wave abnormality, consider anterolateral ischemia  Abnormal ECG  Confirmed by SILVANA RAMIREZ MD ([id number]) on 1/15/2019 9:01:54 AM  < end of copied text >    Carotid Duplex:  < from: VA Duplex Carotid, Bilat (01.14.19 @ 22:46) >  Impression:  40-59% stenosis of the right internal carotid artery.  20-39% stenosis of the left internal carotid artery.  < end of copied text >    PFT's: FEV1 91 %    Echocardiogram: < from: Transthoracic Echocardiogram (01.15.19 @ 08:44) >  Summary:   1. Left ventricular ejection fraction, by visual estimation, is 45 to 50%.   2. Mildly decreased global left ventricular systolic function.   3. Mid and apical anterior septum, apical inferior segment, and apex are abnormal as described above.   4. Trace tricuspid regurgitation.   5. Dilatation of the ascending aorta.    PHYSICIAN INTERPRETATION:  Left Ventricle: The left ventricular internal cavity size is normal. Left ventricular wall thickness is normal. Global LV systolic function was mildly decreased. Left ventricular ejection fraction, by visual estimation, is 45 to 50%.     LV Wall Scoring:  The mid and apical anterior septum, apical inferior segment, and apex arehypokinetic. All remaining scored segments are normal.    Right Ventricle: Normal right ventricular size and function.  Left Atrium: Normal left atrial size.  Right Atrium: Normal right atrial size.  Pericardium: There is no evidence of pericardial effusion.  Mitral Valve: Mild mitral valve regurgitation is seen. The mitral valve is normal in structure.  Tricuspid Valve: Trivial tricuspid regurgitation is visualized. The tricuspid valve is normal.  Aortic Valve: No evidence of aortic valve regurgitation is seen. The aortic valve is trileaflet. No evidence of aortic stenosis.  Aorta: The aortic root is normal in size and structure. There is dilatation of the ascending aorta.  < end of copied text >    Cardiac catheterization: 3VCAD: LAD, Cirx, Diag. Pending official report     Allergies  cats (Unknown)  No Known Drug Allergies  Intolerances      MEDICATIONS  (STANDING):  aspirin  chewable 81 milliGRAM(s) Oral daily  atorvastatin 80 milliGRAM(s) Oral at bedtime  chlorhexidine 4% Liquid 1 Application(s) Topical <User Schedule>  enoxaparin Injectable 100 milliGRAM(s) SubCutaneous two times a day  metoprolol succinate ER 25 milliGRAM(s) Oral daily  pantoprazole    Tablet 40 milliGRAM(s) Oral before breakfast  PARoxetine 20 milliGRAM(s) Oral daily  sodium chloride 0.9%. 400 milliLiter(s) (100 mL/Hr) IV Continuous <Continuous>    Pre-op ACEi/ARB/CCB held 24 hours prior to planned procedure: [x] Yes, [] NO: indication:  Pre-Op Beta-Blockers: [x]Yes, []No: contraindication:  Pre-Op Aspirin: [x]Yes,  []No: contraindication: [] Held for Pre-op cardiac valve surgery with no CAD  Pre-Op Statin: [x]Yes, []No: contraindication:    Cardiac Surgery Risk Factors  CVA and/or carotid/cerebrovascular disease. Yes  No  Explain if Yes  Aortoiliac disease Yes  No  Explain if Yes  Previous MI Yes  No  Explain if Yes  Previos Cardiac Surgery Yes  No  Explain if Yes  Hemodynamics-Unstable or Shock Yes  No  Explain if Yes  diabetes Yes  No  Explain if Yes  Hepatic Failure Yes  No  Explain if Yes  Renal failure and/or dialysis Yes  No  Explain if Yes  Heart failure-type-present or past Yes  No  Explain if Yes  COPD Yes  No  Explain if Yes  Immune System Deficiency Yes  No  Explain if Yes  Malignant Ventricular Arrhythmia Yes  No  Explain if Yes    STS Score:     Pt has AICD/PPM [ ] Yes  [x ] No             Brand Name:  Pre-op Beta Blocker ordered within 24 hrs of surgery (CABG ONLY)?  [x ] Yes  [ ] No  If not, Why?  Type & Cross  [ ] Yes  [ ] No  NPO after Midnight [x ] Yes  [ ] No  Pre-op ABX ordered, to be taped on chart:  [ x] Yes  [ ] No     Hibiclens/Peridex ordered [x ] Yes  [ ] No  Intraop on Hold: PRBCs, CXR, HECTOR [x ]   Consent obtained  [x ] Yes  [ ] No    Assessment/Plan:  67 yo M with PMHx of depression/anxiety now with ACS/NSTEMI. Cardiac Cath revealed severe 3vCAD w/preserved EF.    - Case and plan discussed with CT Surgeon - Dr. Drake  - Continue CTU supportive care and ongoing plan of care as per continuing CTU rounds.    - Continue DVT/GI prophylaxis  - Incentive Spirometry 10 times an hour  - Continue to advance physical activity as tolerated and continue PT/OT as directed  1. CAD: Continue ASA, statin, BB  2. NPO after midnight for planned CABG  3. Hold PM dose of BB due to bradycardia   4. Hold pm dose of lovenox Cardiac Surgery Pre-op Note: CABG  CC: Patient is a 68y old  Male who presents with a chief complaint of chest pain (14 Jan 2019 17:08)    Referring Physician:  Dr. Raymond                                                                                           Surgeon: Dr. Priyank Drake  PMD: Dr. Vu    HPI:  69 yo M with PMHx of depression/anxiety on Paxil, presents to ED with complaints of intermittent chest pain for 1 week duration. Pt states symptoms are worse at night and occasionally occurs at rest. Denies fever/chills, cough, hemoptysis, URI symptoms, abdominal pain, n/v/d, back pain, numbness, tingling, weakness, family hx of cardiac disease, hx of DVT/PE, recent sx, recent travel, trauma/injury. Chest pain described as sharp, intermittent, non-radiating, occurs at rest, occurs randomly, not worsened with exertion. Never had stress test, never smoker. No family history of cardiac disease. (13 Jan 2019 08:23). Patient was evaluated by cardiology determined to be in ACS and NSTEMI in ED. Pt transferred to [place] for cardiac cath which revealed 3v CAD w/preserved EF.    PAST MEDICAL & SURGICAL HISTORY:  Depression  No significant past surgical history    SUBJECTIVE ASSESSMENT: 68y Male patient seen and examined at bedside. Patient denies any acute complaints at this time.     Vital Signs Last 24 Hrs  T(F): 97.5 (15 Mg 2019 08:00), Max: 98 (15 Mg 2019 00:00)  HR: 60 (15 Mg 2019 08:00) (56 - 68)  BP: 114/74 (15 Mg 2019 08:00) (91/59 - 119/73)  Right arm bp: 122/68                                Left arm bp; 120/65  RR: 17 (15 Mg 2019 08:00) (14 - 20)  SpO2: 94% (15 Mg 2019 08:00) (94% - 96%)    I&O's Detail    14 Jan 2019 07:01  -  15 Mg 2019 07:00  --------------------------------------------------------  IN:  Total IN: 0 mL    OUT:    Voided: 250 mL  Total OUT: 250 mL    Net: I&O's Detail    13 Jan 2019 07:01  -  14 Jan 2019 07:00  --------------------------------------------------------  Total NET: -1 mL    14 Jan 2019 07:01  -  15 Mg 2019 07:00  --------------------------------------------------------  Total NET: -250 mL      CAPILLARY BLOOD GLUCOSE  POCT Blood Glucose.: 115 mg/dL (14 Jan 2019 22:06)      Physical Exam:  General: NAD; A&Ox3  Cardiac: S1/S2, RRR, no murmur, no rubs  Lungs: unlabored respirations, CTA b/l, no wheeze, no rales, no crackles  Abdomen: Soft/NT/ND; positive bowel sounds x 4  Extremities: No edema b/l lower extremities; good capillary refill; no cyanosis; palpable 1+ pedal pulses b/l    5 meter walk test: 1_5___sec, 2_5___sec, 3__5_sec        BOWEL MOVEMENT:  [x] YES [] NO, If No, Timing since last BM Day #        LABS:                        14.3   6.21  )-----------( 194      ( 15 Mg 2019 06:08 )             41.7<L>                        15.0   5.98  )-----------( 197      ( 14 Jan 2019 07:08 )             43.3     01-15  144  |  103  |  18  ----------------------------<  131<H>  4.4   |  22  |  1.0    01-14  142  |  104  |  15  ----------------------------<  124<H>  4.5   |  28  |  0.9    Ca    9.2      15 Mg 2019 06:08  Mg     2.1     01-15    TPro  6.1 [6.0 - 8.0]  /  Alb  3.8 [3.5 - 5.2]  /  TBili  1.1 [0.2 - 1.2]  /  DBili  x   /  AST  21 [0 - 41]  /  ALT  20 [0 - 41]  /  AlkPhos  70 [30 - 115]  01-15    PT/INR - ( 15 Mg 2019 06:08 )   PT: ;   INR: 1.10 ratio       PT/INR - ( 14 Jan 2019 07:08 )   PT: ;   INR: 1.18 ratio       PTT - ( 15 Mg 2019 06:08 )  PTT:30.7 sec, PTT - ( 14 Jan 2019 07:08 )  PTT:32.3 sec    CARDIAC MARKERS ( 14 Jan 2019 07:08 )  x     / 0.64 ng/mL / 280 U/L / x     / 10.2 ng/mL  CARDIAC MARKERS ( 13 Jan 2019 10:44 )  x     / 0.59 ng/mL / 267 U/L / x     / 12.8 ng/mL  CARDIAC MARKERS ( 12 Jan 2019 18:50 )  x     / 0.65 ng/mL / x     / x     / 21.1 ng/mL    HGB A1C: Hemoglobin A1C, Whole Blood: 6.4 % (01-13 @ 06:35)    Type + Screen (01.13.19 @ 06:35)    Type + Screen: B POS    MRSA/MSSA PCR (01.15.19 @ 12:30)    MRSA PCR Result.: Negative: mau Stein  By: Real-Time PCR (Polymerase Reaction Method)    Serum Pro-Brain Natriuretic Peptide (01.14.19 @ 20:00)    Serum Pro-Brain Natriuretic Peptide: 243 pg/mL    Urinalysis (01.15.19 @ 12:30)    pH Urine: 6.0    Glucose Qualitative, Urine: >=1000    Blood, Urine: Negative    Color: Yellow    Urine Appearance: Clear    Bilirubin: Small    Ketone - Urine: Negative    Specific Gravity: >=1.030    Protein, Urine: Trace    Urobilinogen: 1.0    Nitrite: Negative    Leukocyte Esterase Concentration: Negative    RADIOLOGY & ADDITIONAL TESTS:  CXR: < from: Xray Chest 2 Views PA/Lat (01.12.19 @ 11:52) >  Impression:  No radiographic evidence of acute cardiopulmonary disease.  < end of copied text >     EKG: < from: 12 Lead ECG (01.15.19 @ 07:24) >  Ventricular Rate 59 BPM  Atrial Rate 59 BPM  P-R Interval 158 ms  QRS Duration 84 ms  Q-T Interval 424 ms  QTC Calculation(Bezet) 419 ms  P Axis 34 degrees  R Axis -18 degrees  T Axis 18 degrees  Diagnosis Line Sinus bradycardia  ST & T wave abnormality, consider anterolateral ischemia  Abnormal ECG  Confirmed by SILVANA RAMIREZ MD ([id number]) on 1/15/2019 9:01:54 AM  < end of copied text >    Carotid Duplex:  < from: VA Duplex Carotid, Bilat (01.14.19 @ 22:46) >  Impression:  40-59% stenosis of the right internal carotid artery.  20-39% stenosis of the left internal carotid artery.  < end of copied text >    PFT's: FEV1 91 %    Echocardiogram: < from: Transthoracic Echocardiogram (01.15.19 @ 08:44) >  Summary:   1. Left ventricular ejection fraction, by visual estimation, is 45 to 50%.   2. Mildly decreased global left ventricular systolic function.   3. Mid and apical anterior septum, apical inferior segment, and apex are abnormal as described above.   4. Trace tricuspid regurgitation.   5. Dilatation of the ascending aorta.    PHYSICIAN INTERPRETATION:  Left Ventricle: The left ventricular internal cavity size is normal. Left ventricular wall thickness is normal. Global LV systolic function was mildly decreased. Left ventricular ejection fraction, by visual estimation, is 45 to 50%.     LV Wall Scoring:  The mid and apical anterior septum, apical inferior segment, and apex arehypokinetic. All remaining scored segments are normal.    Right Ventricle: Normal right ventricular size and function.  Left Atrium: Normal left atrial size.  Right Atrium: Normal right atrial size.  Pericardium: There is no evidence of pericardial effusion.  Mitral Valve: Mild mitral valve regurgitation is seen. The mitral valve is normal in structure.  Tricuspid Valve: Trivial tricuspid regurgitation is visualized. The tricuspid valve is normal.  Aortic Valve: No evidence of aortic valve regurgitation is seen. The aortic valve is trileaflet. No evidence of aortic stenosis.  Aorta: The aortic root is normal in size and structure. There is dilatation of the ascending aorta.  < end of copied text >    Cardiac catheterization: 3VCAD: LAD, Cirx, Diag. Pending official report     < from: CT Chest No Cont (01.15.19 @ 12:34) >  IMPRESSION:  Normal caliber thoracic aorta with minimal atherosclerosis.  No acute intrathoracic pathology.  < end of copied text >    Allergies  cats (Unknown)  No Known Drug Allergies  Intolerances      MEDICATIONS  (STANDING):  aspirin  chewable 81 milliGRAM(s) Oral daily  atorvastatin 80 milliGRAM(s) Oral at bedtime  chlorhexidine 4% Liquid 1 Application(s) Topical <User Schedule>  enoxaparin Injectable 100 milliGRAM(s) SubCutaneous two times a day  metoprolol succinate ER 25 milliGRAM(s) Oral daily  pantoprazole    Tablet 40 milliGRAM(s) Oral before breakfast  PARoxetine 20 milliGRAM(s) Oral daily  sodium chloride 0.9%. 400 milliLiter(s) (100 mL/Hr) IV Continuous <Continuous>    Pre-op ACEi/ARB/CCB held 24 hours prior to planned procedure: [x] Yes, [] NO: indication:  Pre-Op Beta-Blockers: [x]Yes, []No: contraindication:  Pre-Op Aspirin: [x]Yes,  []No: contraindication: [] Held for Pre-op cardiac valve surgery with no CAD  Pre-Op Statin: [x]Yes, []No: contraindication:    Cardiac Surgery Risk Factors  CVA and/or carotid/cerebrovascular disease. Yes  No  Explain if Yes  Aortoiliac disease Yes  No  Explain if Yes  Previous MI Yes  No  Explain if Yes  Previos Cardiac Surgery Yes  No  Explain if Yes  Hemodynamics-Unstable or Shock Yes  No  Explain if Yes  diabetes Yes  No  Explain if Yes  Hepatic Failure Yes  No  Explain if Yes  Renal failure and/or dialysis Yes  No  Explain if Yes  Heart failure-type-present or past Yes  No  Explain if Yes  COPD Yes  No  Explain if Yes  Immune System Deficiency Yes  No  Explain if Yes  Malignant Ventricular Arrhythmia Yes  No  Explain if Yes    STS Score: Isolated CAB CALCULATE   Risk of Mortality: 	0.544% 	  Renal Failure: 	0.503% 	  Permanent Stroke: 	0.655% 	  Prolonged Ventilation: 	2.991% 	  DSW Infection: 	0.127% 	  Reoperation: 	1.929% 	  Morbidity or Mortality: 	5.040% 	  Short Length of Stay: 	66.980% 	  Long Length of Stay: 	1.305%	        Pt has AICD/PPM [ ] Yes  [x ] No             Brand Name:  Pre-op Beta Blocker ordered within 24 hrs of surgery (CABG ONLY)?  [x ] Yes  [ ] No  If not, Why?  Type & Cross  [ ] Yes  [ ] No  NPO after Midnight [x ] Yes  [ ] No  Pre-op ABX ordered, to be taped on chart:  [ x] Yes  [ ] No     Hibiclens/Peridex ordered [x ] Yes  [ ] No  Intraop on Hold: PRBCs, CXR, HECTOR [x ]   Consent obtained  [x ] Yes  [ ] No    Assessment/Plan:  69 yo M with PMHx of depression/anxiety now with ACS/NSTEMI. Cardiac Cath revealed severe 3vCAD w/preserved EF.    - Case and plan discussed with CT Surgeon - Dr. Drake  - Continue CTU supportive care and ongoing plan of care as per continuing CTU rounds.    - Continue DVT/GI prophylaxis  - Incentive Spirometry 10 times an hour  - Continue to advance physical activity as tolerated and continue PT/OT as directed  1. CAD: Continue ASA, statin, BB  2. NPO after midnight for planned CABG  3. Hold PM dose of BB due to bradycardia   4. Hold pm dose of lovenox

## 2019-01-15 NOTE — PRE-ANESTHESIA EVALUATION ADULT - NS MD HP INPLANTS MED DEV
[facility]                         [phone]  None

## 2019-01-15 NOTE — PROGRESS NOTE ADULT - ATTENDING COMMENTS
Patient was seen independently. Latest vital signs and labs were reviewed. Case was discussed with resident in morning rounds.  Agree with resident's assessment & plan which was reviewed by me and modified where necessary.   -Triple vessel Coronary artery disease ,   -Unstable angina with Non ST elevation Myocardial Infarction   - preop cardiac optimization   - plan for Coronary artery bypass grafting as per CT sx  -hold Lovenox on day of surgery Patient was seen independently. Latest vital signs and labs were reviewed. Case was discussed with resident in morning rounds.  Agree with resident's assessment & plan which was reviewed by me and modified where necessary.   -Triple vessel Coronary artery disease ,   -Unstable angina with Non ST elevation Myocardial Infarction   - preop cardiac optimization   - plan for Coronary artery bypass grafting as per CT sx  -anti-coagulation as per cardiology/CT sx

## 2019-01-15 NOTE — PRE-ANESTHESIA EVALUATION ADULT - NSANTHOSAYNRD_GEN_A_CORE
No. RHODA screening performed.  STOP BANG Legend: 0-2 = LOW Risk; 3-4 = INTERMEDIATE Risk; 5-8 = HIGH Risk

## 2019-01-15 NOTE — PROGRESS NOTE ADULT - SUBJECTIVE AND OBJECTIVE BOX
SUBJECTIVE:    Patient is a 68y old Male who presents with a chief complaint of chest pain (15 Mg 2019 10:13)    Currently admitted to medicine with the primary diagnosis of NSTEMI s/p cath, 3 vessel disease      Today is hospital day 3d. Patient went for cardiac cath yesterday with Dr. Carroll, which revealed 3 vessel coronary disease. He is tentatively scheduled for CABG tomorrow 1/16/19 with cardiothoracic surgery pending complete pre-op workup. Patient denies any current lightheadedness, chest pain, palpitations, dyspnea at this time.     PAST MEDICAL & SURGICAL HISTORY  Depression  No significant past surgical history    SOCIAL HISTORY:  Patient denies alcohol, tobacco, and recreational drug use.     From (X ) home ( ) nursing home ( ) other   Ambulation status: (X ) ambulates independently ( ) ambulates with assistance ( ) ambulates with device ( ) dependent   Device: ( ) rolling walker ( ) cane     ALLERGIES:  cats (Unknown)  No Known Drug Allergies    MEDICATIONS:  STANDING MEDICATIONS  aspirin  chewable 81 milliGRAM(s) Oral daily  atorvastatin 80 milliGRAM(s) Oral at bedtime  chlorhexidine 4% Liquid 1 Application(s) Topical <User Schedule>  enoxaparin Injectable 100 milliGRAM(s) SubCutaneous two times a day  metoprolol succinate ER 25 milliGRAM(s) Oral daily  pantoprazole    Tablet 40 milliGRAM(s) Oral before breakfast  PARoxetine 20 milliGRAM(s) Oral daily    PRN MEDICATIONS    VITALS:   T(F): 97.5  HR: 60  BP: 122/75  RR: 18  SpO2: 95%    LABS:                        14.3   6.21  )-----------( 194      ( 15 Mg 2019 06:08 )             41.7     01-15    144  |  103  |  18  ----------------------------<  131<H>  4.4   |  22  |  1.0    Ca    9.2      15 Mg 2019 06:08  Mg     2.1     01-15    TPro  6.1  /  Alb  3.8  /  TBili  1.1  /  DBili  x   /  AST  21  /  ALT  20  /  AlkPhos  70  01-15    PT/INR - ( 15 Mg 2019 06:08 )   PT: 12.60 sec;   INR: 1.10 ratio      PTT - ( 15 Mg 2019 06:08 )  PTT:30.7 sec    CARDIAC MARKERS ( 14 Jan 2019 07:08 )  x     / 0.64 ng/mL / 280 U/L / x     / 10.2 ng/mL    RADIOLOGY:  Echocardiogram ordered, follow up results     PHYSICAL EXAM:  GEN: No acute distress, lying comfortably in bed   LUNGS: Clear to auscultation bilaterally, no labored breathing   HEART: Bradycardic, regular rhythm, no murmurs appreciated   ABD: Soft, non-tender, non-distended. Bowel sounds present.   EXT: Noncyanotic, nonedematous, 2+ peripheral pulses, skin intact   NEURO: AAOX3, CN II-XII grossly intact     Lines/Tubes   Peripheral IV access

## 2019-01-16 LAB
ALBUMIN SERPL ELPH-MCNC: 4.4 G/DL — SIGNIFICANT CHANGE UP (ref 3.5–5.2)
ALP SERPL-CCNC: 49 U/L — SIGNIFICANT CHANGE UP (ref 30–115)
ALT FLD-CCNC: 38 U/L — SIGNIFICANT CHANGE UP (ref 0–41)
ANION GAP SERPL CALC-SCNC: 16 MMOL/L — HIGH (ref 7–14)
ANION GAP SERPL CALC-SCNC: 17 MMOL/L — HIGH (ref 7–14)
APTT BLD: 27.8 SEC — SIGNIFICANT CHANGE UP (ref 27–39.2)
AST SERPL-CCNC: 30 U/L — SIGNIFICANT CHANGE UP (ref 0–41)
BASOPHILS # BLD AUTO: 0.01 K/UL — SIGNIFICANT CHANGE UP (ref 0–0.2)
BASOPHILS # BLD AUTO: 0.04 K/UL — SIGNIFICANT CHANGE UP (ref 0–0.2)
BASOPHILS NFR BLD AUTO: 0.1 % — SIGNIFICANT CHANGE UP (ref 0–1)
BASOPHILS NFR BLD AUTO: 0.6 % — SIGNIFICANT CHANGE UP (ref 0–1)
BILIRUB SERPL-MCNC: 1.7 MG/DL — HIGH (ref 0.2–1.2)
BUN SERPL-MCNC: 13 MG/DL — SIGNIFICANT CHANGE UP (ref 10–20)
BUN SERPL-MCNC: 15 MG/DL — SIGNIFICANT CHANGE UP (ref 10–20)
CALCIUM SERPL-MCNC: 7.3 MG/DL — LOW (ref 8.5–10.1)
CALCIUM SERPL-MCNC: 8.8 MG/DL — SIGNIFICANT CHANGE UP (ref 8.5–10.1)
CHLORIDE SERPL-SCNC: 100 MMOL/L — SIGNIFICANT CHANGE UP (ref 98–110)
CHLORIDE SERPL-SCNC: 105 MMOL/L — SIGNIFICANT CHANGE UP (ref 98–110)
CO2 SERPL-SCNC: 19 MMOL/L — SIGNIFICANT CHANGE UP (ref 17–32)
CO2 SERPL-SCNC: 23 MMOL/L — SIGNIFICANT CHANGE UP (ref 17–32)
CREAT SERPL-MCNC: 0.8 MG/DL — SIGNIFICANT CHANGE UP (ref 0.7–1.5)
CREAT SERPL-MCNC: 0.9 MG/DL — SIGNIFICANT CHANGE UP (ref 0.7–1.5)
EOSINOPHIL # BLD AUTO: 0.01 K/UL — SIGNIFICANT CHANGE UP (ref 0–0.7)
EOSINOPHIL # BLD AUTO: 0.15 K/UL — SIGNIFICANT CHANGE UP (ref 0–0.7)
EOSINOPHIL NFR BLD AUTO: 0.1 % — SIGNIFICANT CHANGE UP (ref 0–8)
EOSINOPHIL NFR BLD AUTO: 2.2 % — SIGNIFICANT CHANGE UP (ref 0–8)
GAS PNL BLDA: SIGNIFICANT CHANGE UP
GAS PNL BLDA: SIGNIFICANT CHANGE UP
GLUCOSE BLDC GLUCOMTR-MCNC: 111 MG/DL — HIGH (ref 70–99)
GLUCOSE BLDC GLUCOMTR-MCNC: 116 MG/DL — HIGH (ref 70–99)
GLUCOSE BLDC GLUCOMTR-MCNC: 145 MG/DL — HIGH (ref 70–99)
GLUCOSE BLDC GLUCOMTR-MCNC: 147 MG/DL — HIGH (ref 70–99)
GLUCOSE SERPL-MCNC: 119 MG/DL — HIGH (ref 70–99)
GLUCOSE SERPL-MCNC: 180 MG/DL — HIGH (ref 70–99)
HCT VFR BLD CALC: 30.3 % — LOW (ref 42–52)
HCT VFR BLD CALC: 39.9 % — LOW (ref 42–52)
HGB BLD-MCNC: 10.2 G/DL — LOW (ref 14–18)
HGB BLD-MCNC: 13.9 G/DL — LOW (ref 14–18)
IMM GRANULOCYTES NFR BLD AUTO: 0.3 % — SIGNIFICANT CHANGE UP (ref 0.1–0.3)
IMM GRANULOCYTES NFR BLD AUTO: 0.7 % — HIGH (ref 0.1–0.3)
INR BLD: 1.41 RATIO — HIGH (ref 0.65–1.3)
LYMPHOCYTES # BLD AUTO: 0.91 K/UL — LOW (ref 1.2–3.4)
LYMPHOCYTES # BLD AUTO: 1.7 K/UL — SIGNIFICANT CHANGE UP (ref 1.2–3.4)
LYMPHOCYTES # BLD AUTO: 10.9 % — LOW (ref 20.5–51.1)
LYMPHOCYTES # BLD AUTO: 25.1 % — SIGNIFICANT CHANGE UP (ref 20.5–51.1)
MAGNESIUM SERPL-MCNC: 1.5 MG/DL — LOW (ref 1.8–2.4)
MAGNESIUM SERPL-MCNC: 1.9 MG/DL — SIGNIFICANT CHANGE UP (ref 1.8–2.4)
MCHC RBC-ENTMCNC: 28.7 PG — SIGNIFICANT CHANGE UP (ref 27–31)
MCHC RBC-ENTMCNC: 29 PG — SIGNIFICANT CHANGE UP (ref 27–31)
MCHC RBC-ENTMCNC: 33.7 G/DL — SIGNIFICANT CHANGE UP (ref 32–37)
MCHC RBC-ENTMCNC: 34.8 G/DL — SIGNIFICANT CHANGE UP (ref 32–37)
MCV RBC AUTO: 83.3 FL — SIGNIFICANT CHANGE UP (ref 80–94)
MCV RBC AUTO: 85.1 FL — SIGNIFICANT CHANGE UP (ref 80–94)
MONOCYTES # BLD AUTO: 0.41 K/UL — SIGNIFICANT CHANGE UP (ref 0.1–0.6)
MONOCYTES # BLD AUTO: 0.46 K/UL — SIGNIFICANT CHANGE UP (ref 0.1–0.6)
MONOCYTES NFR BLD AUTO: 5.5 % — SIGNIFICANT CHANGE UP (ref 1.7–9.3)
MONOCYTES NFR BLD AUTO: 6.1 % — SIGNIFICANT CHANGE UP (ref 1.7–9.3)
NEUTROPHILS # BLD AUTO: 4.44 K/UL — SIGNIFICANT CHANGE UP (ref 1.4–6.5)
NEUTROPHILS # BLD AUTO: 6.88 K/UL — HIGH (ref 1.4–6.5)
NEUTROPHILS NFR BLD AUTO: 65.7 % — SIGNIFICANT CHANGE UP (ref 42.2–75.2)
NEUTROPHILS NFR BLD AUTO: 82.7 % — HIGH (ref 42.2–75.2)
NRBC # BLD: 0 /100 WBCS — SIGNIFICANT CHANGE UP (ref 0–0)
PLATELET # BLD AUTO: 151 K/UL — SIGNIFICANT CHANGE UP (ref 130–400)
PLATELET # BLD AUTO: 180 K/UL — SIGNIFICANT CHANGE UP (ref 130–400)
POTASSIUM SERPL-MCNC: 4.1 MMOL/L — SIGNIFICANT CHANGE UP (ref 3.5–5)
POTASSIUM SERPL-MCNC: 4.4 MMOL/L — SIGNIFICANT CHANGE UP (ref 3.5–5)
POTASSIUM SERPL-SCNC: 4.1 MMOL/L — SIGNIFICANT CHANGE UP (ref 3.5–5)
POTASSIUM SERPL-SCNC: 4.4 MMOL/L — SIGNIFICANT CHANGE UP (ref 3.5–5)
PROT SERPL-MCNC: 5.8 G/DL — LOW (ref 6–8)
PROTHROM AB SERPL-ACNC: 16.2 SEC — HIGH (ref 9.95–12.87)
RBC # BLD: 3.56 M/UL — LOW (ref 4.7–6.1)
RBC # BLD: 4.79 M/UL — SIGNIFICANT CHANGE UP (ref 4.7–6.1)
RBC # FLD: 11.9 % — SIGNIFICANT CHANGE UP (ref 11.5–14.5)
RBC # FLD: 12 % — SIGNIFICANT CHANGE UP (ref 11.5–14.5)
SODIUM SERPL-SCNC: 140 MMOL/L — SIGNIFICANT CHANGE UP (ref 135–146)
SODIUM SERPL-SCNC: 140 MMOL/L — SIGNIFICANT CHANGE UP (ref 135–146)
WBC # BLD: 6.76 K/UL — SIGNIFICANT CHANGE UP (ref 4.8–10.8)
WBC # BLD: 8.33 K/UL — SIGNIFICANT CHANGE UP (ref 4.8–10.8)
WBC # FLD AUTO: 6.76 K/UL — SIGNIFICANT CHANGE UP (ref 4.8–10.8)
WBC # FLD AUTO: 8.33 K/UL — SIGNIFICANT CHANGE UP (ref 4.8–10.8)

## 2019-01-16 RX ORDER — NOREPINEPHRINE BITARTRATE/D5W 8 MG/250ML
0.05 PLASTIC BAG, INJECTION (ML) INTRAVENOUS
Qty: 8 | Refills: 0 | Status: DISCONTINUED | OUTPATIENT
Start: 2019-01-16 | End: 2019-01-17

## 2019-01-16 RX ORDER — SODIUM CHLORIDE 9 MG/ML
1000 INJECTION INTRAMUSCULAR; INTRAVENOUS; SUBCUTANEOUS
Qty: 0 | Refills: 0 | Status: DISCONTINUED | OUTPATIENT
Start: 2019-01-16 | End: 2019-01-18

## 2019-01-16 RX ORDER — CHLORHEXIDINE GLUCONATE 213 G/1000ML
5 SOLUTION TOPICAL EVERY 4 HOURS
Qty: 0 | Refills: 0 | Status: DISCONTINUED | OUTPATIENT
Start: 2019-01-16 | End: 2019-01-17

## 2019-01-16 RX ORDER — FAMOTIDINE 10 MG/ML
20 INJECTION INTRAVENOUS EVERY 12 HOURS
Qty: 0 | Refills: 0 | Status: DISCONTINUED | OUTPATIENT
Start: 2019-01-16 | End: 2019-01-17

## 2019-01-16 RX ORDER — OXYCODONE HYDROCHLORIDE 5 MG/1
10 TABLET ORAL EVERY 4 HOURS
Qty: 0 | Refills: 0 | Status: DISCONTINUED | OUTPATIENT
Start: 2019-01-16 | End: 2019-01-21

## 2019-01-16 RX ORDER — ASPIRIN/CALCIUM CARB/MAGNESIUM 324 MG
325 TABLET ORAL DAILY
Qty: 0 | Refills: 0 | Status: DISCONTINUED | OUTPATIENT
Start: 2019-01-17 | End: 2019-01-21

## 2019-01-16 RX ORDER — HEPARIN SODIUM 5000 [USP'U]/ML
5000 INJECTION INTRAVENOUS; SUBCUTANEOUS EVERY 8 HOURS
Qty: 0 | Refills: 0 | Status: DISCONTINUED | OUTPATIENT
Start: 2019-01-17 | End: 2019-01-21

## 2019-01-16 RX ORDER — ALBUMIN HUMAN 25 %
1000 VIAL (ML) INTRAVENOUS ONCE
Qty: 0 | Refills: 0 | Status: COMPLETED | OUTPATIENT
Start: 2019-01-16 | End: 2019-01-16

## 2019-01-16 RX ORDER — PROPOFOL 10 MG/ML
10 INJECTION, EMULSION INTRAVENOUS
Qty: 1000 | Refills: 0 | Status: DISCONTINUED | OUTPATIENT
Start: 2019-01-16 | End: 2019-01-17

## 2019-01-16 RX ORDER — DEXMEDETOMIDINE HYDROCHLORIDE IN 0.9% SODIUM CHLORIDE 4 UG/ML
0.2 INJECTION INTRAVENOUS
Qty: 200 | Refills: 0 | Status: DISCONTINUED | OUTPATIENT
Start: 2019-01-16 | End: 2019-01-17

## 2019-01-16 RX ORDER — DEXTROSE 50 % IN WATER 50 %
50 SYRINGE (ML) INTRAVENOUS
Qty: 0 | Refills: 0 | Status: DISCONTINUED | OUTPATIENT
Start: 2019-01-16 | End: 2019-01-21

## 2019-01-16 RX ORDER — MAGNESIUM SULFATE 500 MG/ML
1 VIAL (ML) INJECTION EVERY 12 HOURS
Qty: 0 | Refills: 0 | Status: DISCONTINUED | OUTPATIENT
Start: 2019-01-16 | End: 2019-01-21

## 2019-01-16 RX ORDER — VASOPRESSIN 20 [USP'U]/ML
0.03 INJECTION INTRAVENOUS
Qty: 100 | Refills: 0 | Status: DISCONTINUED | OUTPATIENT
Start: 2019-01-16 | End: 2019-01-17

## 2019-01-16 RX ORDER — NITROGLYCERIN 6.5 MG
10 CAPSULE, EXTENDED RELEASE ORAL
Qty: 50 | Refills: 0 | Status: DISCONTINUED | OUTPATIENT
Start: 2019-01-16 | End: 2019-01-17

## 2019-01-16 RX ORDER — MEPERIDINE HYDROCHLORIDE 50 MG/ML
25 INJECTION INTRAMUSCULAR; INTRAVENOUS; SUBCUTANEOUS ONCE
Qty: 0 | Refills: 0 | Status: DISCONTINUED | OUTPATIENT
Start: 2019-01-16 | End: 2019-01-17

## 2019-01-16 RX ORDER — NICARDIPINE HYDROCHLORIDE 30 MG/1
5 CAPSULE, EXTENDED RELEASE ORAL
Qty: 40 | Refills: 0 | Status: DISCONTINUED | OUTPATIENT
Start: 2019-01-16 | End: 2019-01-17

## 2019-01-16 RX ORDER — ALBUMIN HUMAN 25 %
500 VIAL (ML) INTRAVENOUS ONCE
Qty: 0 | Refills: 0 | Status: COMPLETED | OUTPATIENT
Start: 2019-01-16 | End: 2019-01-16

## 2019-01-16 RX ORDER — OXYCODONE HYDROCHLORIDE 5 MG/1
5 TABLET ORAL EVERY 4 HOURS
Qty: 0 | Refills: 0 | Status: DISCONTINUED | OUTPATIENT
Start: 2019-01-16 | End: 2019-01-21

## 2019-01-16 RX ORDER — DOCUSATE SODIUM 100 MG
100 CAPSULE ORAL THREE TIMES A DAY
Qty: 0 | Refills: 0 | Status: DISCONTINUED | OUTPATIENT
Start: 2019-01-16 | End: 2019-01-21

## 2019-01-16 RX ORDER — CEFAZOLIN SODIUM 1 G
1000 VIAL (EA) INJECTION EVERY 8 HOURS
Qty: 0 | Refills: 0 | Status: COMPLETED | OUTPATIENT
Start: 2019-01-16 | End: 2019-01-17

## 2019-01-16 RX ORDER — ASPIRIN/CALCIUM CARB/MAGNESIUM 324 MG
300 TABLET ORAL ONCE
Qty: 0 | Refills: 0 | Status: COMPLETED | OUTPATIENT
Start: 2019-01-16 | End: 2019-01-16

## 2019-01-16 RX ORDER — DEXTROSE 50 % IN WATER 50 %
25 SYRINGE (ML) INTRAVENOUS
Qty: 0 | Refills: 0 | Status: DISCONTINUED | OUTPATIENT
Start: 2019-01-16 | End: 2019-01-21

## 2019-01-16 RX ORDER — ASPIRIN/CALCIUM CARB/MAGNESIUM 324 MG
300 TABLET ORAL ONCE
Qty: 0 | Refills: 0 | Status: COMPLETED | OUTPATIENT
Start: 2019-01-16

## 2019-01-16 RX ORDER — INSULIN HUMAN 100 [IU]/ML
1 INJECTION, SOLUTION SUBCUTANEOUS
Qty: 50 | Refills: 0 | Status: DISCONTINUED | OUTPATIENT
Start: 2019-01-16 | End: 2019-01-18

## 2019-01-16 RX ORDER — PHENYLEPHRINE HYDROCHLORIDE 10 MG/ML
0.5 INJECTION INTRAVENOUS
Qty: 40 | Refills: 0 | Status: DISCONTINUED | OUTPATIENT
Start: 2019-01-16 | End: 2019-01-17

## 2019-01-16 RX ADMIN — CHLORHEXIDINE GLUCONATE 5 MILLILITER(S): 213 SOLUTION TOPICAL at 18:20

## 2019-01-16 RX ADMIN — Medication 100 GRAM(S): at 18:23

## 2019-01-16 RX ADMIN — Medication 250 MILLILITER(S): at 22:10

## 2019-01-16 RX ADMIN — Medication 100 MILLIGRAM(S): at 21:45

## 2019-01-16 RX ADMIN — FAMOTIDINE 20 MILLIGRAM(S): 10 INJECTION INTRAVENOUS at 18:21

## 2019-01-16 RX ADMIN — Medication 100 MILLIGRAM(S): at 18:07

## 2019-01-16 RX ADMIN — CHLORHEXIDINE GLUCONATE 5 MILLILITER(S): 213 SOLUTION TOPICAL at 21:53

## 2019-01-16 RX ADMIN — Medication 300 MILLIGRAM(S): at 21:25

## 2019-01-16 RX ADMIN — CHLORHEXIDINE GLUCONATE 1 APPLICATION(S): 213 SOLUTION TOPICAL at 06:21

## 2019-01-16 NOTE — CONSULT NOTE ADULT - SUBJECTIVE AND OBJECTIVE BOX
69 yo Male with PAST MEDICAL & SURGICAL HISTORY: non smoker, with depression/ anxiety on Paxil. He presented to ED w/ sharp, non-radiating chest pain for 1 wekk which occurs at rest, randomly, not worsened with exertion. Stated his symptoms are worse at night. Denies fever/chills, cough, hemoptysis, URI symptoms, abdominal pain, n/v/d, back pain, numbness, tingling, weakness, smoking, cardiac hx/sx, family hx of cardiac disease, hx of DVT/PE, recent sx, recent travel, trauma/injury. No smoking or alcohol or family history of CVD.   In EE found to have high troponin with EKG changes - NSTEMI. Cath - 3v CAD w/preserved EF.    1/16/2019 - s/p CABG x3    Vital Signs Last 24 Hrs  T(C): 37.2 (16 Jan 2019 18:00), Max: 37.2 (16 Jan 2019 17:00)  T(F): 99 (16 Jan 2019 18:00), Max: 99 (16 Jan 2019 17:00)  HR: 70 (16 Jan 2019 18:00) (56 - 84)  BP: 111/73 (16 Jan 2019 15:00) (96/60 - 128/81)  BP(mean): 85 (16 Jan 2019 15:00) (74 - 91)  RR: 16 (16 Jan 2019 18:00) (11 - 23)  SpO2: 100% (16 Jan 2019 18:00) (94% - 100%)    Intubated, sedated  b/l pleural tubes, SUMP  clean sternal incision  soft abdomen  warm periphery. Non palpable pulse on left foot. right foot with bandage.    7.31/38/141 on A/C 16, PEEP 10, FiO2 100%    a/p:    ACS- NSTEMI  CAD s/p CABG x3  HYpomagnesemia  Acute post thoracotomy pain    Wean to extubated  hemodynamic support as needed  IVF colloid and crystalloid  replace magnesium IV  Pain control PRN  glycemic control  DVT and GI proph    35 minutes of critical care time spent providing medical care for patient's acute illness/conditions that impairs at least one vital organ system and/or poses a high risk of imminent or life threatening deterioration in the patient's condition. It includes time spent evaluating and treating the patient's acute illness as well as time spent reviewing labs, radiology, discussing goals of care with patient and/or patient's family, and discussing the case with a multidisciplinary team in an effort to prevent further life threatening deterioration or end organ damage. This time is independent of any procedures performed.

## 2019-01-16 NOTE — PRE-OP CHECKLIST - SELECT TESTS ORDERED
Urinalysis/EKG/BMP/POCT Blood Glucose/PT/PTT/Hepatic Function/CXR/CBC/INR/Spirometry/Type and Cross/Type and Screen/CMP

## 2019-01-16 NOTE — PACU DISCHARGE NOTE - COMMENTS
Patient intubated, VSS, no pressors at this time.    Vitals: 126/65, P: 80, RR: 18, SpO2: 100, Temp:36.2 Patient intubated, VSS, no pressors at this time.    Vitals: 126/65, P: 80, RR: 18, SpO2: 100, Temp:36.2  Post-Op  C.O. 6.7  C.I. 3.1

## 2019-01-17 ENCOUNTER — TRANSCRIPTION ENCOUNTER (OUTPATIENT)
Age: 69
End: 2019-01-17

## 2019-01-17 LAB
ALBUMIN SERPL ELPH-MCNC: 4.5 G/DL — SIGNIFICANT CHANGE UP (ref 3.5–5.2)
ALP SERPL-CCNC: 42 U/L — SIGNIFICANT CHANGE UP (ref 30–115)
ALT FLD-CCNC: 30 U/L — SIGNIFICANT CHANGE UP (ref 0–41)
ANION GAP SERPL CALC-SCNC: 18 MMOL/L — HIGH (ref 7–14)
APTT BLD: 27.8 SEC — SIGNIFICANT CHANGE UP (ref 27–39.2)
AST SERPL-CCNC: 24 U/L — SIGNIFICANT CHANGE UP (ref 0–41)
BASOPHILS # BLD AUTO: 0.01 K/UL — SIGNIFICANT CHANGE UP (ref 0–0.2)
BASOPHILS NFR BLD AUTO: 0.2 % — SIGNIFICANT CHANGE UP (ref 0–1)
BILIRUB SERPL-MCNC: 1.5 MG/DL — HIGH (ref 0.2–1.2)
BUN SERPL-MCNC: 11 MG/DL — SIGNIFICANT CHANGE UP (ref 10–20)
CALCIUM SERPL-MCNC: 7.7 MG/DL — LOW (ref 8.5–10.1)
CHLORIDE SERPL-SCNC: 104 MMOL/L — SIGNIFICANT CHANGE UP (ref 98–110)
CO2 SERPL-SCNC: 19 MMOL/L — SIGNIFICANT CHANGE UP (ref 17–32)
CREAT SERPL-MCNC: 0.8 MG/DL — SIGNIFICANT CHANGE UP (ref 0.7–1.5)
EOSINOPHIL # BLD AUTO: 0 K/UL — SIGNIFICANT CHANGE UP (ref 0–0.7)
EOSINOPHIL NFR BLD AUTO: 0 % — SIGNIFICANT CHANGE UP (ref 0–8)
GAS PNL BLDA: SIGNIFICANT CHANGE UP
GLUCOSE BLDC GLUCOMTR-MCNC: 104 MG/DL — HIGH (ref 70–99)
GLUCOSE BLDC GLUCOMTR-MCNC: 107 MG/DL — HIGH (ref 70–99)
GLUCOSE BLDC GLUCOMTR-MCNC: 107 MG/DL — HIGH (ref 70–99)
GLUCOSE BLDC GLUCOMTR-MCNC: 111 MG/DL — HIGH (ref 70–99)
GLUCOSE BLDC GLUCOMTR-MCNC: 118 MG/DL — HIGH (ref 70–99)
GLUCOSE BLDC GLUCOMTR-MCNC: 121 MG/DL — HIGH (ref 70–99)
GLUCOSE BLDC GLUCOMTR-MCNC: 130 MG/DL — HIGH (ref 70–99)
GLUCOSE BLDC GLUCOMTR-MCNC: 137 MG/DL — HIGH (ref 70–99)
GLUCOSE BLDC GLUCOMTR-MCNC: 137 MG/DL — HIGH (ref 70–99)
GLUCOSE BLDC GLUCOMTR-MCNC: 144 MG/DL — HIGH (ref 70–99)
GLUCOSE BLDC GLUCOMTR-MCNC: 150 MG/DL — HIGH (ref 70–99)
GLUCOSE BLDC GLUCOMTR-MCNC: 170 MG/DL — HIGH (ref 70–99)
GLUCOSE BLDC GLUCOMTR-MCNC: 171 MG/DL — HIGH (ref 70–99)
GLUCOSE BLDC GLUCOMTR-MCNC: 80 MG/DL — SIGNIFICANT CHANGE UP (ref 70–99)
GLUCOSE SERPL-MCNC: 104 MG/DL — HIGH (ref 70–99)
HCT VFR BLD CALC: 26.2 % — LOW (ref 42–52)
HGB BLD-MCNC: 8.9 G/DL — LOW (ref 14–18)
IMM GRANULOCYTES NFR BLD AUTO: 0.2 % — SIGNIFICANT CHANGE UP (ref 0.1–0.3)
INR BLD: 1.32 RATIO — HIGH (ref 0.65–1.3)
LYMPHOCYTES # BLD AUTO: 0.58 K/UL — LOW (ref 1.2–3.4)
LYMPHOCYTES # BLD AUTO: 11.3 % — LOW (ref 20.5–51.1)
MAGNESIUM SERPL-MCNC: 1.8 MG/DL — SIGNIFICANT CHANGE UP (ref 1.8–2.4)
MCHC RBC-ENTMCNC: 28.6 PG — SIGNIFICANT CHANGE UP (ref 27–31)
MCHC RBC-ENTMCNC: 34 G/DL — SIGNIFICANT CHANGE UP (ref 32–37)
MCV RBC AUTO: 84.2 FL — SIGNIFICANT CHANGE UP (ref 80–94)
MONOCYTES # BLD AUTO: 0.39 K/UL — SIGNIFICANT CHANGE UP (ref 0.1–0.6)
MONOCYTES NFR BLD AUTO: 7.6 % — SIGNIFICANT CHANGE UP (ref 1.7–9.3)
NEUTROPHILS # BLD AUTO: 4.16 K/UL — SIGNIFICANT CHANGE UP (ref 1.4–6.5)
NEUTROPHILS NFR BLD AUTO: 80.7 % — HIGH (ref 42.2–75.2)
PLATELET # BLD AUTO: 134 K/UL — SIGNIFICANT CHANGE UP (ref 130–400)
POTASSIUM SERPL-MCNC: 3.8 MMOL/L — SIGNIFICANT CHANGE UP (ref 3.5–5)
POTASSIUM SERPL-SCNC: 3.8 MMOL/L — SIGNIFICANT CHANGE UP (ref 3.5–5)
PROT SERPL-MCNC: 5.8 G/DL — LOW (ref 6–8)
PROTHROM AB SERPL-ACNC: 15.1 SEC — HIGH (ref 9.95–12.87)
RBC # BLD: 3.11 M/UL — LOW (ref 4.7–6.1)
RBC # FLD: 12.1 % — SIGNIFICANT CHANGE UP (ref 11.5–14.5)
SODIUM SERPL-SCNC: 141 MMOL/L — SIGNIFICANT CHANGE UP (ref 135–146)
WBC # BLD: 5.15 K/UL — SIGNIFICANT CHANGE UP (ref 4.8–10.8)
WBC # FLD AUTO: 5.15 K/UL — SIGNIFICANT CHANGE UP (ref 4.8–10.8)

## 2019-01-17 RX ORDER — ATORVASTATIN CALCIUM 80 MG/1
40 TABLET, FILM COATED ORAL AT BEDTIME
Qty: 0 | Refills: 0 | Status: DISCONTINUED | OUTPATIENT
Start: 2019-01-17 | End: 2019-01-21

## 2019-01-17 RX ORDER — METOPROLOL TARTRATE 50 MG
12.5 TABLET ORAL EVERY 12 HOURS
Qty: 0 | Refills: 0 | Status: DISCONTINUED | OUTPATIENT
Start: 2019-01-17 | End: 2019-01-18

## 2019-01-17 RX ORDER — POTASSIUM CHLORIDE 20 MEQ
20 PACKET (EA) ORAL ONCE
Qty: 0 | Refills: 0 | Status: COMPLETED | OUTPATIENT
Start: 2019-01-17 | End: 2019-01-17

## 2019-01-17 RX ORDER — FAMOTIDINE 10 MG/ML
20 INJECTION INTRAVENOUS
Qty: 0 | Refills: 0 | Status: DISCONTINUED | OUTPATIENT
Start: 2019-01-17 | End: 2019-01-21

## 2019-01-17 RX ORDER — KETOROLAC TROMETHAMINE 30 MG/ML
30 SYRINGE (ML) INJECTION ONCE
Qty: 0 | Refills: 0 | Status: DISCONTINUED | OUTPATIENT
Start: 2019-01-17 | End: 2019-01-17

## 2019-01-17 RX ORDER — ACETAMINOPHEN 500 MG
650 TABLET ORAL EVERY 6 HOURS
Qty: 0 | Refills: 0 | Status: DISCONTINUED | OUTPATIENT
Start: 2019-01-17 | End: 2019-01-21

## 2019-01-17 RX ORDER — CHLORHEXIDINE GLUCONATE 213 G/1000ML
1 SOLUTION TOPICAL
Qty: 0 | Refills: 0 | Status: DISCONTINUED | OUTPATIENT
Start: 2019-01-17 | End: 2019-01-21

## 2019-01-17 RX ORDER — MAGNESIUM SULFATE 500 MG/ML
1 VIAL (ML) INJECTION ONCE
Qty: 0 | Refills: 0 | Status: COMPLETED | OUTPATIENT
Start: 2019-01-17 | End: 2019-01-17

## 2019-01-17 RX ADMIN — Medication 100 MILLIGRAM(S): at 21:47

## 2019-01-17 RX ADMIN — Medication 30 MILLIGRAM(S): at 06:30

## 2019-01-17 RX ADMIN — OXYCODONE HYDROCHLORIDE 10 MILLIGRAM(S): 5 TABLET ORAL at 03:50

## 2019-01-17 RX ADMIN — Medication 325 MILLIGRAM(S): at 11:25

## 2019-01-17 RX ADMIN — OXYCODONE HYDROCHLORIDE 10 MILLIGRAM(S): 5 TABLET ORAL at 16:19

## 2019-01-17 RX ADMIN — Medication 100 MILLIEQUIVALENT(S): at 08:57

## 2019-01-17 RX ADMIN — CHLORHEXIDINE GLUCONATE 1 APPLICATION(S): 213 SOLUTION TOPICAL at 20:00

## 2019-01-17 RX ADMIN — Medication 650 MILLIGRAM(S): at 03:23

## 2019-01-17 RX ADMIN — OXYCODONE HYDROCHLORIDE 10 MILLIGRAM(S): 5 TABLET ORAL at 17:13

## 2019-01-17 RX ADMIN — Medication 100 MILLIGRAM(S): at 13:50

## 2019-01-17 RX ADMIN — Medication 30 MILLIGRAM(S): at 06:45

## 2019-01-17 RX ADMIN — Medication 100 GRAM(S): at 17:01

## 2019-01-17 RX ADMIN — HEPARIN SODIUM 5000 UNIT(S): 5000 INJECTION INTRAVENOUS; SUBCUTANEOUS at 13:50

## 2019-01-17 RX ADMIN — FAMOTIDINE 20 MILLIGRAM(S): 10 INJECTION INTRAVENOUS at 06:35

## 2019-01-17 RX ADMIN — HEPARIN SODIUM 5000 UNIT(S): 5000 INJECTION INTRAVENOUS; SUBCUTANEOUS at 21:45

## 2019-01-17 RX ADMIN — Medication 100 MILLIGRAM(S): at 06:35

## 2019-01-17 RX ADMIN — Medication 12.5 MILLIGRAM(S): at 11:24

## 2019-01-17 RX ADMIN — OXYCODONE HYDROCHLORIDE 10 MILLIGRAM(S): 5 TABLET ORAL at 22:15

## 2019-01-17 RX ADMIN — OXYCODONE HYDROCHLORIDE 10 MILLIGRAM(S): 5 TABLET ORAL at 03:20

## 2019-01-17 RX ADMIN — Medication 100 MILLIGRAM(S): at 06:45

## 2019-01-17 RX ADMIN — Medication 12.5 MILLIGRAM(S): at 17:02

## 2019-01-17 RX ADMIN — Medication 100 GRAM(S): at 06:34

## 2019-01-17 RX ADMIN — HEPARIN SODIUM 5000 UNIT(S): 5000 INJECTION INTRAVENOUS; SUBCUTANEOUS at 06:45

## 2019-01-17 RX ADMIN — ATORVASTATIN CALCIUM 40 MILLIGRAM(S): 80 TABLET, FILM COATED ORAL at 21:47

## 2019-01-17 RX ADMIN — FAMOTIDINE 20 MILLIGRAM(S): 10 INJECTION INTRAVENOUS at 17:01

## 2019-01-17 RX ADMIN — Medication 100 GRAM(S): at 18:47

## 2019-01-17 RX ADMIN — OXYCODONE HYDROCHLORIDE 10 MILLIGRAM(S): 5 TABLET ORAL at 21:45

## 2019-01-17 NOTE — DIETITIAN INITIAL EVALUATION ADULT. - OTHER INFO
CAD/NSTEMI-s/p CABG x 3-POD #1--febrile overnight post CABG. Vitals stable, minimal drain outputs. Reason for Assessment: poor po x 5days

## 2019-01-17 NOTE — DISCHARGE NOTE ADULT - CARE PLAN
Principal Discharge DX:	S/P CABG x 3  Goal:	to maintain graft patency  Assessment and plan of treatment:	cont anti-platelet therapy

## 2019-01-17 NOTE — DISCHARGE NOTE ADULT - MEDICATION SUMMARY - MEDICATIONS TO TAKE
I will START or STAY ON the medications listed below when I get home from the hospital:    aspirin 325 mg oral delayed release tablet  -- 1 tab(s) by mouth once a day  -- Indication: For Anti-platelet    oxycodone-acetaminophen 5 mg-325 mg oral tablet  -- 1 tab(s) by mouth every 6 hours, As Needed -for moderate pain MDD:6  -- Caution federal law prohibits the transfer of this drug to any person other  than the person for whom it was prescribed.  May cause drowsiness.  Alcohol may intensify this effect.  Use care when operating dangerous machinery.  This prescription cannot be refilled.  This product contains acetaminophen.  Do not use  with any other product containing acetaminophen to prevent possible liver damage.  Using more of this medication than prescribed may cause serious breathing problems.    -- Indication: For pain management    Paxil 20 mg oral tablet  -- 1 tab(s) by mouth once a day  -- Indication: For Anti-depressant    atorvastatin 40 mg oral tablet  -- 1 tab(s) by mouth once a day (at bedtime)  -- Indication: For Hyperlipidemia    metoprolol tartrate 25 mg oral tablet  -- 1 tab(s) by mouth 3 times a day  -- Indication: For Hypertension and heart rate control    Lasix 40 mg oral tablet  -- 1 tab(s) by mouth once a day   -- Avoid prolonged or excessive exposure to direct and/or artificial sunlight while taking this medication.  It is very important that you take or use this exactly as directed.  Do not skip doses or discontinue unless directed by your doctor.  It may be advisable to drink a full glass orange juice or eat a banana daily while taking this medication.    -- Indication: For Diuretic    famotidine 20 mg oral tablet  -- 1 tab(s) by mouth 2 times a day  -- Indication: For to prevent stomach ulcer     docusate sodium 100 mg oral capsule  -- 1 cap(s) by mouth 3 times a day  -- Indication: For stool softener    K-Tab 20 mEq oral tablet, extended release  -- 1 tab(s) by mouth once a day   -- It is very important that you take or use this exactly as directed.  Do not skip doses or discontinue unless directed by your doctor.  Medication should be taken with plenty of water.  Take with food or milk.    -- Indication: For supplement    fluticasone 50 mcg/inh nasal spray  -- 1 spray(s) into nose 2 times a day  -- Indication: For Nasal spray

## 2019-01-17 NOTE — DISCHARGE NOTE ADULT - ADDITIONAL INSTRUCTIONS
please please follow up with cts next tues 1/29 and with cardiology in 2 weeks as well as with pmd in 4 weeks

## 2019-01-17 NOTE — DISCHARGE NOTE ADULT - HOSPITAL COURSE
69 yo M with PMHx of depression/anxiety on Paxil, presents to ED with complaints of intermittent chest pain for 1 week duration. Pt states symptoms are worse at night and occasionally occurs at rest. Denies fever/chills, cough, hemoptysis, URI symptoms, abdominal pain, n/v/d, back pain, numbness, tingling, weakness, family hx of cardiac disease, hx of DVT/PE, recent sx, recent travel, trauma/injury. Chest pain described as sharp, intermittent, non-radiating, occurs at rest, occurs randomly, not worsened with exertion. Never had stress test, never smoker. No family history of cardiac disease. (13 Jan 2019 08:23). Patient was evaluated by cardiology determined to be in ACS and NSTEMI in ED. Pt transferred to [place] for cardiac cath which revealed 3v CAD w/preserved EF.  On 1/16, the patient underwent an off pump cabg x 3.  Postoperatively, the patient had an uneventful hospital course and was discharged home with visiting nurse services in stable condition on POD # 5.

## 2019-01-17 NOTE — DISCHARGE NOTE ADULT - CARE PROVIDER_API CALL
Wilfrido Nick), Thoracic and Cardiac Surgery  [address]  [address]  [place] [address]  Phone: [phone]  Fax: [phone]    Ezra Raymond), Cardiovascular Disease; Internal Medicine; Interventional Cardiology  [address] [place] [address]  Phone: [phone]  Fax: [phone]    Quentin Vu), Internal Medicine  [address] [place] [address]  Phone: [phone]  Fax: [phone]

## 2019-01-17 NOTE — DISCHARGE NOTE ADULT - NS AS ACTIVITY OBS
No Heavy lifting/straining/Showering allowed/Walking-Indoors allowed/Do not drive or operate machinery/Walking-Outdoors allowed/Stairs allowed

## 2019-01-17 NOTE — DISCHARGE NOTE ADULT - CARE PROVIDERS DIRECT ADDRESSES
,DirectAddress_Unknown,[email or web address]

## 2019-01-17 NOTE — PROGRESS NOTE ADULT - SUBJECTIVE AND OBJECTIVE BOX
TYREL BENAVIDEZ  MRN#: [id number]  Subjective:  Patient was seen and evalauted on AM rounds     OBJECTIVE:  ICU Vital Signs Last 24 Hrs  T(C): 37.7 (2019 09:00), Max: 38.4 (2019 01:00)  T(F): 99.8 (2019 09:00), Max: 101.1 (2019 01:00)  HR: 74 (2019 11:36) (68 - 84)  BP: 107/65 (2019 11:00) (105/68 - 111/73)  BP(mean): 74 (2019 11:00) (74 - 85)  ABP: 114/46 (2019 11:36) (100/54 - 142/60)  ABP(mean): 66 (2019 11:36) (66 - 88)  RR: 28 (2019 11:00) (11 - 34)  SpO2: 96% (2019 11:36) (91% - 100%)       @ 07: @ 07:00  --------------------------------------------------------  IN: 1426.5 mL / OUT: 1328 mL / NET: 98.5 mL     @ 07: @ 11:49  --------------------------------------------------------  IN: 518 mL / OUT: 125 mL / NET: 393 mL      CAPILLARY BLOOD GLUCOSE  121 (2019 10:00)      POCT Blood Glucose.: 121 mg/dL (2019 10:00)      PHYSICAL EXAM:Daily     Daily Weight in k.5 (2019 06:00)  General: WN/WD NAD    HEENT:     + NCAT  + EOMI  - Conjuctival edema   - Icterus   - Thrush   - ETT  - NGT/OGT    Neck:         + FROM    - JVD     - Nodes     - Masses    + Mid-line trachea   - Tracheostomy    Chest:         - Sternal click  - Sternal drainage  + Pacing wires  + Chest tubes  - SubQ emphysema    Lungs:          + CTA   - Rhonchi    - Rales    - Wheezing     - Decreased BS   - Dullness R L    Cardiac:       + S1 + S2    + RRR   - Irregular   - S3  - S4    - Murmurs   - Rub   - Hamman’s sign     Abdomen:    + BS     + Soft    + Non-tender     - Distended    - Organomegaly  - PEG    Extremities:   - Cyanosis U/L   - Clubbing  U/L  - LE/UE Edema   + Capillary refill    + Pulses     Neuro:        + Awake   +  Alert   - Confused   - Lethargic   - Sedated   - Generalized Weakness    Skin:        - Rashes    - Erythema   + Normal incisions   + IV sites intact  - Sacral decubitus    HOSPITAL MEDICATIONS:  MEDICATIONS  (STANDING):  aspirin enteric coated 325 milliGRAM(s) Oral daily  atorvastatin 40 milliGRAM(s) Oral at bedtime  chlorhexidine 4% Liquid 1 Application(s) Topical <User Schedule>  dextrose 50% Injectable 50 milliLiter(s) IV Push every 15 minutes  dextrose 50% Injectable 25 milliLiter(s) IV Push every 15 minutes  docusate sodium 100 milliGRAM(s) Oral three times a day  famotidine    Tablet 20 milliGRAM(s) Oral two times a day  heparin  Injectable 5000 Unit(s) SubCutaneous every 8 hours  insulin Infusion 1 Unit(s)/Hr (1 mL/Hr) IV Continuous <Continuous>  magnesium sulfate  IVPB 1 Gram(s) IV Intermittent every 12 hours  metoprolol tartrate 12.5 milliGRAM(s) Oral every 12 hours  sodium chloride 0.9%. 1000 milliLiter(s) (10 mL/Hr) IV Continuous <Continuous>    MEDICATIONS  (PRN):  acetaminophen   Tablet .. 650 milliGRAM(s) Oral every 6 hours PRN Temp greater or equal to 38.5C (101.3F), Moderate Pain (4 - 6)  oxyCODONE    IR 5 milliGRAM(s) Oral every 4 hours PRN Moderate Pain (4 - 6)  oxyCODONE    IR 10 milliGRAM(s) Oral every 4 hours PRN Severe Pain (7 - 10)      LABS:                        8.9    5.15  )-----------( 134      ( 2019 02:00 )             26.2    01-17    141  |  104  |  11  ----------------------------<  104<H>  3.8   |  19  |  0.8    Ca    7.7<L>      2019 02:00  Mg     1.8         TPro  5.8<L>  /  Alb  4.5  /  TBili  1.5<H>  /  DBili  x   /  AST  24  /  ALT  30  /  AlkPhos  42      PT/INR - ( 2019 02:00 )   PT: 15.10 sec;   INR: 1.32 ratio         PTT - ( 2019 02:00 )  PTT:27.8 sec LIVER FUNCTIONS - ( 2019 02:00 )  Alb: 4.5 g/dL / Pro: 5.8 g/dL / ALK PHOS: 42 U/L / ALT: 30 U/L / AST: 24 U/L / GGT: x           Urinalysis Basic - ( 15 Mg 2019 12:30 )    Color: Yellow / Appearance: Clear / SG: >=1.030 / pH: x  Gluc: x / Ketone: Negative  / Bili: Small / Urobili: 1.0   Blood: x / Protein: Trace / Nitrite: Negative   Leuk Esterase: Negative / RBC: 1-2 /HPF / WBC x   Sq Epi: x / Non Sq Epi: x / Bacteria: x        RADIOLOGY:  X Reviewed and interpreted by me: clear bilat    CARDIOPULMONARY DYSFUNCTION  - Respiratory status required supplemental oxygen & the following of continuous pulse oximetry for support & to prevent decompensation  - Continued early mobilization as tolerated  - Addressed analgesic regimen to optimize function    PREVENTION-PROPHYLAXIS  - ASA continued for graft occlusion-thromboembolism prophylaxis  - Lipitor was also started for long term graft patency  - Heparin initiated/continued for VTE prophylaxis in addition to Venodyne boots  - Pepcid maintained for GI bleeding prophylaxis  - Lopressor initiated for atrial fibrillation prophylaxis  - Metabolic stability & infection prophylaxis required review and adjustment of regular Insulin sliding scale and gylcemic regimen while following serial glucose levels to help achieve & maintain euglycemia  - Reviewed & addressed surgical site infection prophylaxis regimen

## 2019-01-17 NOTE — DISCHARGE NOTE ADULT - PATIENT PORTAL LINK FT
You can access the [facility] Patient Portal, offered by [facility], by registering with the following website: [email or web address]

## 2019-01-17 NOTE — DIETITIAN INITIAL EVALUATION ADULT. - ENERGY NEEDS
Calories: 9208-6220 kcal/day (MSJ x 1-1.2 AF)--obese BMI considered   Protein: 78-94 gm/day (1-1.2 gm/kg IBW)   Fluid: per CTU team

## 2019-01-17 NOTE — DIETITIAN INITIAL EVALUATION ADULT. - FEEDING SKILL
independent/Pt has been NPO for the past couple of days, and his diet was just advanced yesterday. Before that pt was tolerating clears well, without any issues.

## 2019-01-17 NOTE — DISCHARGE NOTE ADULT - INSTRUCTIONS
please avoid any heavy lifting, pushing, or pulling anything > 10 lbs x 3 months; please no driving or sitting in the front seat or sleeping on side x 6 weeks; check temp and weight daily and shower daily  heart healthy carb consistent

## 2019-01-17 NOTE — PHYSICAL THERAPY INITIAL EVALUATION ADULT - GENERAL OBSERVATIONS, REHAB EVAL
1055-1120am; Chart reviewed. Pt. seen in bedside chair. + nc O2(3Lmin), telemetry, right radial A line, catheter, cordis catheter. Pt denies any pain at this time

## 2019-01-17 NOTE — DIETITIAN INITIAL EVALUATION ADULT. - PHYSICAL APPEARANCE
alert and oriented. BMI: 30.8 using lowest wt of 97kg, IBW: 172#, UBW: ~219#, denies any recent wt changes. no edema noted, surgical incision/obese

## 2019-01-17 NOTE — PROGRESS NOTE ADULT - SUBJECTIVE AND OBJECTIVE BOX
OPERATIVE PROCEDURE(s):                POD #                       SURGEON(s): JOAN Drake  SUBJECTIVE ASSESSMENT: 68y Male patient seen and examined at bedside.    Vital Signs Last 24 Hrs  T(F): 99.8 (17 Jan 2019 08:00), Max: 101.1 (17 Jan 2019 01:00)  HR: 76 (17 Jan 2019 08:00) (68 - 84)  BP: 105/68 (16 Jan 2019 19:30) (105/68 - 111/73)  BP(mean): 78 (16 Jan 2019 19:30) (78 - 85)  ABP: 116/54 (17 Jan 2019 08:00) (100/56 - 142/60)  ABP(mean): 74 (17 Jan 2019 08:00)  RR: 28 (17 Jan 2019 08:00) (11 - 34)  SpO2: 95% (17 Jan 2019 08:00) (91% - 100%)  CVP(mm Hg): 12 (17 Jan 2019 08:00)  CVP(cm H2O): --  CO: 7 (17 Jan 2019 08:00)  CI: 3.3 (17 Jan 2019 08:00)  PA: 29/16 (17 Jan 2019 08:00)  SVR: 707 (17 Jan 2019 08:00)  Mode: CPAP with PS  FiO2: 45  PEEP: 5  PS: 10    I&O's Detail    16 Jan 2019 07:01  -  17 Jan 2019 07:00  --------------------------------------------------------  IN:    dexmedetomidine Infusion: 68.1 mL    insulin Infusion: 23 mL    IV PiggyBack: 350 mL    niCARdipine Infusion: 75 mL    nitroglycerin  Infusion: 37.5 mL    Oral Fluid: 620 mL    propofol Infusion: 32.9 mL    sodium chloride 0.9%.: 220 mL  Total IN: 1426.5 mL    OUT:    Chest Tube: 75 mL    Chest Tube: 170 mL    Chest Tube: 100 mL    Indwelling Catheter - Urethral: 883 mL    Nasoenteral Tube: 100 mL  Total OUT: 1328 mL    Net: I&O's Detail    15 Mg 2019 07:01  -  16 Jan 2019 07:00  --------------------------------------------------------  Total NET: 150 mL    16 Jan 2019 07:01  -  17 Jan 2019 07:00  --------------------------------------------------------  Total NET: 98.5 mL        CAPILLARY BLOOD GLUCOSE  130 (17 Jan 2019 08:00)  119 (17 Jan 2019 05:15)  106 (16 Jan 2019 22:45)  128 (16 Jan 2019 21:15)  145 (16 Jan 2019 18:00)  189 (16 Jan 2019 17:00)  179 (16 Jan 2019 16:00)      POCT Blood Glucose.: 137 mg/dL (17 Jan 2019 06:18)  POCT Blood Glucose.: 118 mg/dL (17 Jan 2019 04:28)  POCT Blood Glucose.: 111 mg/dL (17 Jan 2019 01:45)  POCT Blood Glucose.: 104 mg/dL (17 Jan 2019 00:05)  POCT Blood Glucose.: 111 mg/dL (16 Jan 2019 22:33)  POCT Blood Glucose.: 147 mg/dL (16 Jan 2019 20:01)  POCT Blood Glucose.: 145 mg/dL (16 Jan 2019 18:26)      Physical Exam:  General: NAD; A&Ox3  Cardiac: S1/S2, RRR, no murmur, no rubs  Lungs: unlabored respirations, CTA b/l, no wheeze, no rales, no crackles  Abdomen: Soft/NT/ND; positive bowel sounds x 4  Sternum: Intact, no click, incision healing well with no drainage  Incisions: Incisions clean/dry/intact  Extremities: No edema b/l lower extremities; good capillary refill; no cyanosis; palpable 1+ pedal pulses b/l    Central Venous Catheter: Yes[]  No[] , If Yes indication:                    Day #  Prasad Catheter: Yes  [] , No  [] , If yes indication:                                 Day #  NGT: Yes [] No [] ,    If Yes Placement:                                                   Day #  EPICARDIAL WIRES:  [] YES [] NO                                                            Day #  BOWEL MOVEMENT:  [] YES [] NO, If No, Timing since last BM Day #  CHEST TUBE(Left/Right):  [] YES [] NO, If yes -  AIR LEAKS:  [] YES [] NO        LABS:                        8.9<L>  5.15  )-----------( 134      ( 17 Jan 2019 02:00 )             26.2<L>                        10.2<L>  8.33  )-----------( 151      ( 16 Jan 2019 15:40 )             30.3<L>    01-17    141  |  104  |  11  ----------------------------<  104<H>  3.8   |  19  |  0.8  01-16    140  |  105  |  13  ----------------------------<  180<H>  4.4   |  19  |  0.9    Ca    7.7<L>      17 Jan 2019 02:00  Mg     1.8     01-17    TPro  5.8<L> [6.0 - 8.0]  /  Alb  4.5 [3.5 - 5.2]  /  TBili  1.5<H> [0.2 - 1.2]  /  DBili  x   /  AST  24 [0 - 41]  /  ALT  30 [0 - 41]  /  AlkPhos  42 [30 - 115]  01-17    PT/INR - ( 17 Jan 2019 02:00 )   PT: ;   INR: 1.32 ratio       PT/INR - ( 16 Jan 2019 15:40 )   PT: ;   INR: 1.41 ratio      PTT - ( 17 Jan 2019 02:00 )  PTT:27.8 sec, PTT - ( 16 Jan 2019 15:40 )  PTT:27.8 sec    Urinalysis Basic - ( 15 Mg 2019 12:30 )    Color: Yellow / Appearance: Clear / SG: >=1.030 / pH: x  Gluc: x / Ketone: Negative  / Bili: Small / Urobili: 1.0   Blood: x / Protein: Trace / Nitrite: Negative   Leuk Esterase: Negative / RBC: 1-2 /HPF / WBC x   Sq Epi: x / Non Sq Epi: x / Bacteria: x      ABG - ( 17 Jan 2019 05:17 )  pH: 7.37  /  pCO2: 36    /  pO2: 82    / HCO3: 20    / Base Excess: -4.4  /  SaO2: 97    /  LA: 0.7        RADIOLOGY & ADDITIONAL TESTS:  CXR:   EKG:  Allergies    cats (Unknown)  No Known Drug Allergies    Intolerances      MEDICATIONS  (STANDING):  aspirin enteric coated 325 milliGRAM(s) Oral daily  chlorhexidine 0.12% Liquid 5 milliLiter(s) Oral Mucosa every 4 hours  dexmedetomidine Infusion 0.2 MICROgram(s)/kG/Hr (4.86 mL/Hr) IV Continuous <Continuous>  dextrose 50% Injectable 50 milliLiter(s) IV Push every 15 minutes  dextrose 50% Injectable 25 milliLiter(s) IV Push every 15 minutes  docusate sodium 100 milliGRAM(s) Oral three times a day  famotidine Injectable 20 milliGRAM(s) IV Push every 12 hours  heparin  Injectable 5000 Unit(s) SubCutaneous every 8 hours  insulin Infusion 1 Unit(s)/Hr (1 mL/Hr) IV Continuous <Continuous>  magnesium sulfate  IVPB 1 Gram(s) IV Intermittent every 12 hours  meperidine     Injectable 25 milliGRAM(s) IV Push once  niCARdipine Infusion 5 mG/Hr (25 mL/Hr) IV Continuous <Continuous>  nitroglycerin  Infusion 10 MICROgram(s)/Min (3 mL/Hr) IV Continuous <Continuous>  norepinephrine Infusion 0.05 MICROgram(s)/kG/Min (9.113 mL/Hr) IV Continuous <Continuous>  phenylephrine    Infusion 0.5 MICROgram(s)/kG/Min (9.113 mL/Hr) IV Continuous <Continuous>  propofol Infusion 10 MICROgram(s)/kG/Min (5.832 mL/Hr) IV Continuous <Continuous>  sodium chloride 0.9%. 1000 milliLiter(s) (10 mL/Hr) IV Continuous <Continuous>  vasopressin Infusion 0.03 Unit(s)/Min (1.8 mL/Hr) IV Continuous <Continuous>    MEDICATIONS  (PRN):  acetaminophen   Tablet .. 650 milliGRAM(s) Oral every 6 hours PRN Temp greater or equal to 38.5C (101.3F), Moderate Pain (4 - 6)  oxyCODONE    IR 5 milliGRAM(s) Oral every 4 hours PRN Moderate Pain (4 - 6)  oxyCODONE    IR 10 milliGRAM(s) Oral every 4 hours PRN Severe Pain (7 - 10)    Home Medications:  Paxil 20 mg oral tablet: 1 tab(s) orally once a day (12 Jan 2019 11:29)    Pharmacologic DVT Prophylaxis [] YES, [] NO: Contraindication:  [] HEPARIN: Dose: XX mg  Q24H     [] LOVENOX: Dose: XX mg  Q24H  SCD's: YES b/l    GI Prophylaxis: Protonix [], Pepcid []    Post-Op Beta-Blockers: [] Yes, [] No: contraindication:  Post-Op Nitrate: [] Yes, [] No: contraindication:  Post-Op Aspirin:  [] Yes, [] No: contraindication:  Post-Op Statin:  [] Yes, [] No: contraindication:    Ambulation/Activity Status:    Assessment/Plan:  68y Male status-post  - Case and plan discussed with CTU Intensivist and CT Surgeon - Dr. Nick/Vidal/Jeanette   - Continue CTU supportive care and ongoing plan of care as per continuing CTU rounds.   - Continue DVT/GI prophylaxis  - Incentive Spirometry 10 times an hour  - Continue to advance physical activity as tolerated and continue PT/OT as directed  1. CAD: Continue ASA, statin, BB  2. HTN:   3. A. Fib:   4. COPD/Hypoxia:   5. DM/Glucose Control:     Social Service Disposition: OPERATIVE PROCEDURE(s):          CABGx3	      POD #      1    SURGEON(s): JOAN Drake  SUBJECTIVE ASSESSMENT: 68y Male patient seen and examined at bedside.    Vital Signs Last 24 Hrs  T(F): 99.8 (17 Jan 2019 08:00), Max: 101.1 (17 Jan 2019 01:00)  HR: 76 (17 Jan 2019 08:00) (68 - 84)  BP: 105/68 (16 Jan 2019 19:30) (105/68 - 111/73)  BP(mean): 78 (16 Jan 2019 19:30) (78 - 85)  ABP: 116/54 (17 Jan 2019 08:00) (100/56 - 142/60)  ABP(mean): 74 (17 Jan 2019 08:00)  RR: 28 (17 Jan 2019 08:00) (11 - 34)  SpO2: 95% (17 Jan 2019 08:00) (91% - 100%)  CVP(mm Hg): 12 (17 Jan 2019 08:00)  CVP(cm H2O): --  CO: 7 (17 Jan 2019 08:00)  CI: 3.3 (17 Jan 2019 08:00)  PA: 29/16 (17 Jan 2019 08:00)  SVR: 707 (17 Jan 2019 08:00)  Mode: CPAP with PS  FiO2: 45  PEEP: 5  PS: 10    I&O's Detail    16 Jan 2019 07:01  -  17 Jan 2019 07:00  --------------------------------------------------------  IN:    dexmedetomidine Infusion: 68.1 mL    insulin Infusion: 23 mL    IV PiggyBack: 350 mL    niCARdipine Infusion: 75 mL    nitroglycerin  Infusion: 37.5 mL    Oral Fluid: 620 mL    propofol Infusion: 32.9 mL    sodium chloride 0.9%.: 220 mL  Total IN: 1426.5 mL    OUT:    Chest Tube: 75 mL    Chest Tube: 170 mL    Chest Tube: 100 mL    Indwelling Catheter - Urethral: 883 mL    Nasoenteral Tube: 100 mL  Total OUT: 1328 mL    Net: I&O's Detail    15 Mg 2019 07:01  -  16 Jan 2019 07:00  --------------------------------------------------------  Total NET: 150 mL    16 Jan 2019 07:01  -  17 Jan 2019 07:00  --------------------------------------------------------  Total NET: 98.5 mL        CAPILLARY BLOOD GLUCOSE  130 (17 Jan 2019 08:00)  119 (17 Jan 2019 05:15)  106 (16 Jan 2019 22:45)  128 (16 Jan 2019 21:15)  145 (16 Jan 2019 18:00)  189 (16 Jan 2019 17:00)  179 (16 Jan 2019 16:00)      POCT Blood Glucose.: 137 mg/dL (17 Jan 2019 06:18)  POCT Blood Glucose.: 118 mg/dL (17 Jan 2019 04:28)  POCT Blood Glucose.: 111 mg/dL (17 Jan 2019 01:45)  POCT Blood Glucose.: 104 mg/dL (17 Jan 2019 00:05)  POCT Blood Glucose.: 111 mg/dL (16 Jan 2019 22:33)  POCT Blood Glucose.: 147 mg/dL (16 Jan 2019 20:01)  POCT Blood Glucose.: 145 mg/dL (16 Jan 2019 18:26)      Physical Exam:  General: NAD; A&Ox3  Cardiac: S1/S2, RRR, no murmur, no rubs  Lungs: unlabored respirations, CTA b/l, no wheeze, no rales, no crackles  Abdomen: Soft/NT/ND; positive bowel sounds x 4  Sternum: Intact, no click, incision healing well with no drainage  Incisions: Incisions clean/dry/intact  Extremities: No edema b/l lower extremities; good capillary refill; no cyanosis; palpable 1+ pedal pulses b/l    Central Venous Catheter: Yes[]  No[] , If Yes indication:                    Day #  Prasad Catheter: Yes  [] , No  [] , If yes indication:                                 Day #  NGT: Yes [] No [] ,    If Yes Placement:                                                   Day #  EPICARDIAL WIRES:  [] YES [] NO                                                            Day #  BOWEL MOVEMENT:  [] YES [] NO, If No, Timing since last BM Day #  CHEST TUBE(Left/Right):  [] YES [] NO, If yes -  AIR LEAKS:  [] YES [] NO        LABS:                        8.9<L>  5.15  )-----------( 134      ( 17 Jan 2019 02:00 )             26.2<L>                        10.2<L>  8.33  )-----------( 151      ( 16 Jan 2019 15:40 )             30.3<L>    01-17    141  |  104  |  11  ----------------------------<  104<H>  3.8   |  19  |  0.8  01-16    140  |  105  |  13  ----------------------------<  180<H>  4.4   |  19  |  0.9    Ca    7.7<L>      17 Jan 2019 02:00  Mg     1.8     01-17    TPro  5.8<L> [6.0 - 8.0]  /  Alb  4.5 [3.5 - 5.2]  /  TBili  1.5<H> [0.2 - 1.2]  /  DBili  x   /  AST  24 [0 - 41]  /  ALT  30 [0 - 41]  /  AlkPhos  42 [30 - 115]  01-17    PT/INR - ( 17 Jan 2019 02:00 )   PT: ;   INR: 1.32 ratio       PT/INR - ( 16 Jan 2019 15:40 )   PT: ;   INR: 1.41 ratio      PTT - ( 17 Jan 2019 02:00 )  PTT:27.8 sec, PTT - ( 16 Jan 2019 15:40 )  PTT:27.8 sec    Urinalysis Basic - ( 15 Mg 2019 12:30 )    Color: Yellow / Appearance: Clear / SG: >=1.030 / pH: x  Gluc: x / Ketone: Negative  / Bili: Small / Urobili: 1.0   Blood: x / Protein: Trace / Nitrite: Negative   Leuk Esterase: Negative / RBC: 1-2 /HPF / WBC x   Sq Epi: x / Non Sq Epi: x / Bacteria: x      ABG - ( 17 Jan 2019 05:17 )  pH: 7.37  /  pCO2: 36    /  pO2: 82    / HCO3: 20    / Base Excess: -4.4  /  SaO2: 97    /  LA: 0.7        RADIOLOGY & ADDITIONAL TESTS:  CXR:   EKG:  Allergies    cats (Unknown)  No Known Drug Allergies    Intolerances      MEDICATIONS  (STANDING):  aspirin enteric coated 325 milliGRAM(s) Oral daily  chlorhexidine 0.12% Liquid 5 milliLiter(s) Oral Mucosa every 4 hours  dexmedetomidine Infusion 0.2 MICROgram(s)/kG/Hr (4.86 mL/Hr) IV Continuous <Continuous>  dextrose 50% Injectable 50 milliLiter(s) IV Push every 15 minutes  dextrose 50% Injectable 25 milliLiter(s) IV Push every 15 minutes  docusate sodium 100 milliGRAM(s) Oral three times a day  famotidine Injectable 20 milliGRAM(s) IV Push every 12 hours  heparin  Injectable 5000 Unit(s) SubCutaneous every 8 hours  insulin Infusion 1 Unit(s)/Hr (1 mL/Hr) IV Continuous <Continuous>  magnesium sulfate  IVPB 1 Gram(s) IV Intermittent every 12 hours  meperidine     Injectable 25 milliGRAM(s) IV Push once  niCARdipine Infusion 5 mG/Hr (25 mL/Hr) IV Continuous <Continuous>  nitroglycerin  Infusion 10 MICROgram(s)/Min (3 mL/Hr) IV Continuous <Continuous>  norepinephrine Infusion 0.05 MICROgram(s)/kG/Min (9.113 mL/Hr) IV Continuous <Continuous>  phenylephrine    Infusion 0.5 MICROgram(s)/kG/Min (9.113 mL/Hr) IV Continuous <Continuous>  propofol Infusion 10 MICROgram(s)/kG/Min (5.832 mL/Hr) IV Continuous <Continuous>  sodium chloride 0.9%. 1000 milliLiter(s) (10 mL/Hr) IV Continuous <Continuous>  vasopressin Infusion 0.03 Unit(s)/Min (1.8 mL/Hr) IV Continuous <Continuous>    MEDICATIONS  (PRN):  acetaminophen   Tablet .. 650 milliGRAM(s) Oral every 6 hours PRN Temp greater or equal to 38.5C (101.3F), Moderate Pain (4 - 6)  oxyCODONE    IR 5 milliGRAM(s) Oral every 4 hours PRN Moderate Pain (4 - 6)  oxyCODONE    IR 10 milliGRAM(s) Oral every 4 hours PRN Severe Pain (7 - 10)    Home Medications:  Paxil 20 mg oral tablet: 1 tab(s) orally once a day (12 Jan 2019 11:29)    Pharmacologic DVT Prophylaxis [] YES, [] NO: Contraindication:  [] HEPARIN: Dose: XX mg  Q24H     [] LOVENOX: Dose: XX mg  Q24H  SCD's: YES b/l    GI Prophylaxis: Protonix [], Pepcid []    Post-Op Beta-Blockers: [] Yes, [] No: contraindication:  Post-Op Nitrate: [] Yes, [] No: contraindication:  Post-Op Aspirin:  [] Yes, [] No: contraindication:  Post-Op Statin:  [] Yes, [] No: contraindication:    Ambulation/Activity Status:    Assessment/Plan:  68y Male status-post  - Case and plan discussed with CTU Intensivist and CT Surgeon - Dr. Nick/Vidal/Jeanette   - Continue CTU supportive care and ongoing plan of care as per continuing CTU rounds.   - Continue DVT/GI prophylaxis  - Incentive Spirometry 10 times an hour  - Continue to advance physical activity as tolerated and continue PT/OT as directed  1. CAD: Continue ASA, statin, BB  2. HTN:   3. A. Fib:   4. COPD/Hypoxia:   5. DM/Glucose Control:     Social Service Disposition:

## 2019-01-17 NOTE — PHYSICAL THERAPY INITIAL EVALUATION ADULT - PHYSICAL ASSIST/NONPHYSICAL ASSIST: GAIT, REHAB EVAL
1 person + 1 person to manage equipment/Pt needed verbal cues to correct posture. Followed with w/c/verbal cues

## 2019-01-18 LAB
APTT BLD: 30.3 SEC — SIGNIFICANT CHANGE UP (ref 27–39.2)
BASOPHILS # BLD AUTO: 0.02 K/UL — SIGNIFICANT CHANGE UP (ref 0–0.2)
BASOPHILS NFR BLD AUTO: 0.3 % — SIGNIFICANT CHANGE UP (ref 0–1)
EOSINOPHIL # BLD AUTO: 0.01 K/UL — SIGNIFICANT CHANGE UP (ref 0–0.7)
EOSINOPHIL NFR BLD AUTO: 0.2 % — SIGNIFICANT CHANGE UP (ref 0–8)
GLUCOSE BLDC GLUCOMTR-MCNC: 103 MG/DL — HIGH (ref 70–99)
GLUCOSE BLDC GLUCOMTR-MCNC: 108 MG/DL — HIGH (ref 70–99)
GLUCOSE BLDC GLUCOMTR-MCNC: 120 MG/DL — HIGH (ref 70–99)
GLUCOSE BLDC GLUCOMTR-MCNC: 132 MG/DL — HIGH (ref 70–99)
GLUCOSE BLDC GLUCOMTR-MCNC: 163 MG/DL — HIGH (ref 70–99)
GLUCOSE BLDC GLUCOMTR-MCNC: 163 MG/DL — HIGH (ref 70–99)
GLUCOSE BLDC GLUCOMTR-MCNC: 170 MG/DL — HIGH (ref 70–99)
GLUCOSE BLDC GLUCOMTR-MCNC: 244 MG/DL — HIGH (ref 70–99)
HCT VFR BLD CALC: 25.5 % — LOW (ref 42–52)
HGB BLD-MCNC: 8.6 G/DL — LOW (ref 14–18)
IMM GRANULOCYTES NFR BLD AUTO: 0.3 % — SIGNIFICANT CHANGE UP (ref 0.1–0.3)
INR BLD: 1.52 RATIO — HIGH (ref 0.65–1.3)
LYMPHOCYTES # BLD AUTO: 1.01 K/UL — LOW (ref 1.2–3.4)
LYMPHOCYTES # BLD AUTO: 15.8 % — LOW (ref 20.5–51.1)
MAGNESIUM SERPL-MCNC: 2.2 MG/DL — SIGNIFICANT CHANGE UP (ref 1.8–2.4)
MCHC RBC-ENTMCNC: 28.3 PG — SIGNIFICANT CHANGE UP (ref 27–31)
MCHC RBC-ENTMCNC: 33.7 G/DL — SIGNIFICANT CHANGE UP (ref 32–37)
MCV RBC AUTO: 83.9 FL — SIGNIFICANT CHANGE UP (ref 80–94)
MONOCYTES # BLD AUTO: 0.46 K/UL — SIGNIFICANT CHANGE UP (ref 0.1–0.6)
MONOCYTES NFR BLD AUTO: 7.2 % — SIGNIFICANT CHANGE UP (ref 1.7–9.3)
NEUTROPHILS # BLD AUTO: 4.86 K/UL — SIGNIFICANT CHANGE UP (ref 1.4–6.5)
NEUTROPHILS NFR BLD AUTO: 76.2 % — HIGH (ref 42.2–75.2)
PLATELET # BLD AUTO: 127 K/UL — LOW (ref 130–400)
PROTHROM AB SERPL-ACNC: 17.4 SEC — HIGH (ref 9.95–12.87)
RBC # BLD: 3.04 M/UL — LOW (ref 4.7–6.1)
RBC # FLD: 12 % — SIGNIFICANT CHANGE UP (ref 11.5–14.5)
WBC # BLD: 6.38 K/UL — SIGNIFICANT CHANGE UP (ref 4.8–10.8)
WBC # FLD AUTO: 6.38 K/UL — SIGNIFICANT CHANGE UP (ref 4.8–10.8)

## 2019-01-18 RX ORDER — INSULIN LISPRO 100/ML
VIAL (ML) SUBCUTANEOUS
Qty: 0 | Refills: 0 | Status: DISCONTINUED | OUTPATIENT
Start: 2019-01-18 | End: 2019-01-21

## 2019-01-18 RX ORDER — POTASSIUM CHLORIDE 20 MEQ
20 PACKET (EA) ORAL
Qty: 0 | Refills: 0 | Status: COMPLETED | OUTPATIENT
Start: 2019-01-18 | End: 2019-01-18

## 2019-01-18 RX ORDER — POTASSIUM CHLORIDE 20 MEQ
20 PACKET (EA) ORAL ONCE
Qty: 0 | Refills: 0 | Status: COMPLETED | OUTPATIENT
Start: 2019-01-18 | End: 2019-01-18

## 2019-01-18 RX ORDER — FUROSEMIDE 40 MG
40 TABLET ORAL ONCE
Qty: 0 | Refills: 0 | Status: COMPLETED | OUTPATIENT
Start: 2019-01-18 | End: 2019-01-18

## 2019-01-18 RX ORDER — INSULIN GLARGINE 100 [IU]/ML
15 INJECTION, SOLUTION SUBCUTANEOUS EVERY MORNING
Qty: 0 | Refills: 0 | Status: DISCONTINUED | OUTPATIENT
Start: 2019-01-18 | End: 2019-01-21

## 2019-01-18 RX ORDER — NICARDIPINE HYDROCHLORIDE 30 MG/1
5 CAPSULE, EXTENDED RELEASE ORAL
Qty: 40 | Refills: 0 | Status: DISCONTINUED | OUTPATIENT
Start: 2019-01-18 | End: 2019-01-21

## 2019-01-18 RX ORDER — INSULIN LISPRO 100/ML
5 VIAL (ML) SUBCUTANEOUS
Qty: 0 | Refills: 0 | Status: DISCONTINUED | OUTPATIENT
Start: 2019-01-18 | End: 2019-01-21

## 2019-01-18 RX ORDER — METOPROLOL TARTRATE 50 MG
25 TABLET ORAL
Qty: 0 | Refills: 0 | Status: DISCONTINUED | OUTPATIENT
Start: 2019-01-18 | End: 2019-01-18

## 2019-01-18 RX ORDER — METOPROLOL TARTRATE 50 MG
25 TABLET ORAL ONCE
Qty: 0 | Refills: 0 | Status: COMPLETED | OUTPATIENT
Start: 2019-01-18 | End: 2019-01-18

## 2019-01-18 RX ORDER — SODIUM CHLORIDE 9 MG/ML
1000 INJECTION, SOLUTION INTRAVENOUS
Qty: 0 | Refills: 0 | Status: DISCONTINUED | OUTPATIENT
Start: 2019-01-18 | End: 2019-01-21

## 2019-01-18 RX ORDER — FUROSEMIDE 40 MG
20 TABLET ORAL ONCE
Qty: 0 | Refills: 0 | Status: COMPLETED | OUTPATIENT
Start: 2019-01-18 | End: 2019-01-18

## 2019-01-18 RX ORDER — METOPROLOL TARTRATE 50 MG
50 TABLET ORAL
Qty: 0 | Refills: 0 | Status: DISCONTINUED | OUTPATIENT
Start: 2019-01-18 | End: 2019-01-19

## 2019-01-18 RX ORDER — METOPROLOL TARTRATE 50 MG
5 TABLET ORAL ONCE
Qty: 0 | Refills: 0 | Status: COMPLETED | OUTPATIENT
Start: 2019-01-18 | End: 2019-01-18

## 2019-01-18 RX ORDER — GLUCAGON INJECTION, SOLUTION 0.5 MG/.1ML
1 INJECTION, SOLUTION SUBCUTANEOUS ONCE
Qty: 0 | Refills: 0 | Status: DISCONTINUED | OUTPATIENT
Start: 2019-01-18 | End: 2019-01-21

## 2019-01-18 RX ORDER — DEXTROSE 50 % IN WATER 50 %
15 SYRINGE (ML) INTRAVENOUS ONCE
Qty: 0 | Refills: 0 | Status: DISCONTINUED | OUTPATIENT
Start: 2019-01-18 | End: 2019-01-21

## 2019-01-18 RX ADMIN — ATORVASTATIN CALCIUM 40 MILLIGRAM(S): 80 TABLET, FILM COATED ORAL at 21:02

## 2019-01-18 RX ADMIN — Medication 5 MILLIGRAM(S): at 04:30

## 2019-01-18 RX ADMIN — Medication 20 MILLIGRAM(S): at 04:00

## 2019-01-18 RX ADMIN — Medication 20 MILLIGRAM(S): at 11:43

## 2019-01-18 RX ADMIN — Medication 25 MILLIGRAM(S): at 06:20

## 2019-01-18 RX ADMIN — FAMOTIDINE 20 MILLIGRAM(S): 10 INJECTION INTRAVENOUS at 05:16

## 2019-01-18 RX ADMIN — Medication 5 UNIT(S): at 16:41

## 2019-01-18 RX ADMIN — Medication 40 MILLIGRAM(S): at 09:01

## 2019-01-18 RX ADMIN — Medication 100 MILLIGRAM(S): at 21:02

## 2019-01-18 RX ADMIN — Medication 100 MILLIEQUIVALENT(S): at 03:00

## 2019-01-18 RX ADMIN — Medication 100 MILLIEQUIVALENT(S): at 04:00

## 2019-01-18 RX ADMIN — HEPARIN SODIUM 5000 UNIT(S): 5000 INJECTION INTRAVENOUS; SUBCUTANEOUS at 14:34

## 2019-01-18 RX ADMIN — Medication 100 MILLIGRAM(S): at 05:16

## 2019-01-18 RX ADMIN — Medication 25 MILLIGRAM(S): at 01:13

## 2019-01-18 RX ADMIN — Medication 5 UNIT(S): at 11:33

## 2019-01-18 RX ADMIN — HEPARIN SODIUM 5000 UNIT(S): 5000 INJECTION INTRAVENOUS; SUBCUTANEOUS at 05:16

## 2019-01-18 RX ADMIN — HEPARIN SODIUM 5000 UNIT(S): 5000 INJECTION INTRAVENOUS; SUBCUTANEOUS at 21:01

## 2019-01-18 RX ADMIN — FAMOTIDINE 20 MILLIGRAM(S): 10 INJECTION INTRAVENOUS at 17:27

## 2019-01-18 RX ADMIN — Medication 1: at 16:42

## 2019-01-18 RX ADMIN — Medication 100 MILLIEQUIVALENT(S): at 11:34

## 2019-01-18 RX ADMIN — Medication 325 MILLIGRAM(S): at 11:34

## 2019-01-18 RX ADMIN — Medication 2: at 11:32

## 2019-01-18 RX ADMIN — Medication 50 MILLIGRAM(S): at 18:09

## 2019-01-18 RX ADMIN — Medication 100 GRAM(S): at 17:27

## 2019-01-18 RX ADMIN — Medication 100 MILLIGRAM(S): at 14:34

## 2019-01-18 RX ADMIN — Medication 20 MILLIEQUIVALENT(S): at 03:58

## 2019-01-18 RX ADMIN — Medication 100 GRAM(S): at 05:16

## 2019-01-18 RX ADMIN — Medication 5 MILLIGRAM(S): at 06:15

## 2019-01-18 RX ADMIN — Medication 25 MILLIGRAM(S): at 05:17

## 2019-01-18 RX ADMIN — INSULIN GLARGINE 15 UNIT(S): 100 INJECTION, SOLUTION SUBCUTANEOUS at 12:40

## 2019-01-18 NOTE — PROGRESS NOTE ADULT - SUBJECTIVE AND OBJECTIVE BOX
TYREL BENAVIDEZ  MRN#: [id number]  Subjective:  Patient was seen and evalauted on AM rounds     OBJECTIVE:  ICU Vital Signs Last 24 Hrs  T(C): 37.7 (2019 04:00), Max: 37.7 (2019 14:00)  T(F): 99.8 (2019 04:00), Max: 99.8 (2019 14:00)  HR: 76 (2019 11:00) (74 - 96)  BP: 118/64 (2019 20:00) (118/64 - 124/64)  BP(mean): 80 (2019 20:00) (80 - 81)  ABP: 98/54 (2019 11:00) (98/40 - 146/62)  ABP(mean): 70 (2019 11:00) (60 - 88)  RR: 32 (2019 11:00) (17 - 39)  SpO2: 95% (2019 11:00) (84% - 98%)       @ 07: @ 07:00  --------------------------------------------------------  IN: 1982 mL / OUT: 1628 mL / NET: 354 mL     @ 07: @ 12:06  --------------------------------------------------------  IN: 162 mL / OUT: 599 mL / NET: -437 mL      CAPILLARY BLOOD GLUCOSE  163 (2019 08:00)      POCT Blood Glucose.: 244 mg/dL (2019 11:19)      PHYSICAL EXAM:Daily Height in cm: 177.8 (2019 13:57)    Daily Weight in k.1 (2019 06:00)  General: WN/WD NAD    HEENT:     + NCAT  + EOMI  - Conjuctival edema   - Icterus   - Thrush   - ETT  - NGT/OGT    Neck:         + FROM    - JVD     - Nodes     - Masses    + Mid-line trachea   - Tracheostomy    Chest:         - Sternal click  - Sternal drainage  + Pacing wires  + Chest tubes  - SubQ emphysema    Lungs:          + CTA   - Rhonchi    - Rales    - Wheezing     - Decreased BS   - Dullness R L    Cardiac:       + S1 + S2    + RRR   - Irregular   - S3  - S4    - Murmurs   - Rub   - Hamman’s sign     Abdomen:    + BS     + Soft    + Non-tender     - Distended    - Organomegaly  - PEG    Extremities:   - Cyanosis U/L   - Clubbing  U/L  - LE/UE Edema   + Capillary refill    + Pulses     Neuro:        + Awake   +  Alert   - Confused   - Lethargic   - Sedated   - Generalized Weakness    Skin:        - Rashes    - Erythema   + Normal incisions   + IV sites intact  - Sacral decubitus    HOSPITAL MEDICATIONS:  MEDICATIONS  (STANDING):  aspirin enteric coated 325 milliGRAM(s) Oral daily  atorvastatin 40 milliGRAM(s) Oral at bedtime  chlorhexidine 4% Liquid 1 Application(s) Topical <User Schedule>  dextrose 5%. 1000 milliLiter(s) (50 mL/Hr) IV Continuous <Continuous>  dextrose 50% Injectable 50 milliLiter(s) IV Push every 15 minutes  dextrose 50% Injectable 25 milliLiter(s) IV Push every 15 minutes  docusate sodium 100 milliGRAM(s) Oral three times a day  famotidine    Tablet 20 milliGRAM(s) Oral two times a day  heparin  Injectable 5000 Unit(s) SubCutaneous every 8 hours  insulin glargine Injectable (LANTUS) 15 Unit(s) SubCutaneous every morning  insulin lispro (HumaLOG) corrective regimen sliding scale   SubCutaneous three times a day before meals  insulin lispro Injectable (HumaLOG) 5 Unit(s) SubCutaneous before breakfast  insulin lispro Injectable (HumaLOG) 5 Unit(s) SubCutaneous before lunch  insulin lispro Injectable (HumaLOG) 5 Unit(s) SubCutaneous before dinner  magnesium sulfate  IVPB 1 Gram(s) IV Intermittent every 12 hours  metoprolol tartrate 50 milliGRAM(s) Oral two times a day  niCARdipine Infusion 5 mG/Hr (25 mL/Hr) IV Continuous <Continuous>  PARoxetine 20 milliGRAM(s) Oral daily    MEDICATIONS  (PRN):  acetaminophen   Tablet .. 650 milliGRAM(s) Oral every 6 hours PRN Temp greater or equal to 38.5C (101.3F), Moderate Pain (4 - 6)  dextrose 40% Gel 15 Gram(s) Oral once PRN Blood Glucose LESS THAN 70 milliGRAM(s)/deciliter  glucagon  Injectable 1 milliGRAM(s) IntraMuscular once PRN Glucose LESS THAN 70 milligrams/deciliter  oxyCODONE    IR 5 milliGRAM(s) Oral every 4 hours PRN Moderate Pain (4 - 6)  oxyCODONE    IR 10 milliGRAM(s) Oral every 4 hours PRN Severe Pain (7 - 10)      LABS:                        8.6    6.38  )-----------( 127      ( 2019 02:00 )             25.5    01-    138  |  101  |  10  ----------------------------<  116<H>  3.4<L>   |  21  |  0.8    Ca    8.0<L>      2019 02:00  Mg     2.2         TPro  5.8<L>  /  Alb  4.5  /  TBili  1.5<H>  /  DBili  x   /  AST  24  /  ALT  30  /  AlkPhos  42  -17    PT/INR - ( 2019 02:00 )   PT: 17.40 sec;   INR: 1.52 ratio         PTT - ( 2019 02:00 )  PTT:30.3 sec LIVER FUNCTIONS - ( 2019 02:00 )  Alb: 4.5 g/dL / Pro: 5.8 g/dL / ALK PHOS: 42 U/L / ALT: 30 U/L / AST: 24 U/L / GGT: x               RADIOLOGY:  X Reviewed and interpreted by me: bilat opacities/effusions    CARDIOPULMONARY DYSFUNCTION  - Respiratory status required supplemental oxygen & the following of continuous pulse oximetry for support & to prevent decompensation  - Continued early mobilization as tolerated  - Addressed analgesic regimen to optimize function    PREVENTION-PROPHYLAXIS  - ASA continued for graft occlusion-thromboembolism prophylaxis  - Lipitor was also started for long term graft patency  - Heparin initiated/continued for VTE prophylaxis in addition to Venodyne boots  - Pepcid maintained for GI bleeding prophylaxis  - Lopressor initiated/continued for atrial fibrillation prophylaxis  - Metabolic stability & infection prophylaxis required review and adjustment of regular Insulin sliding scale and gylcemic regimen while following serial glucose levels to help achieve & maintain euglycemia  - Reviewed & addressed surgical site infection prophylaxis regimen

## 2019-01-18 NOTE — PROGRESS NOTE ADULT - SUBJECTIVE AND OBJECTIVE BOX
CTS ATTENDING    POD#2 AFTER CBAGx3  AWAKE AND ALERT  AFEBRILE  VITALS STABLE  TOLERATING DIET      WILL REMOVE LAMB AND A-LINE AFTER DOSE OF LASIX  MOBILIZE  CONTROL BP  ENCOURAGE COUGH, DEEP BREATTHING

## 2019-01-19 DIAGNOSIS — E87.79 OTHER FLUID OVERLOAD: ICD-10-CM

## 2019-01-19 DIAGNOSIS — I10 ESSENTIAL (PRIMARY) HYPERTENSION: ICD-10-CM

## 2019-01-19 DIAGNOSIS — I25.119 ATHEROSCLEROTIC HEART DISEASE OF NATIVE CORONARY ARTERY WITH UNSPECIFIED ANGINA PECTORIS: ICD-10-CM

## 2019-01-19 DIAGNOSIS — R14.0 ABDOMINAL DISTENSION (GASEOUS): ICD-10-CM

## 2019-01-19 LAB
ANION GAP SERPL CALC-SCNC: 13 MMOL/L — SIGNIFICANT CHANGE UP (ref 7–14)
BASOPHILS # BLD AUTO: 0.02 K/UL — SIGNIFICANT CHANGE UP (ref 0–0.2)
BASOPHILS NFR BLD AUTO: 0.3 % — SIGNIFICANT CHANGE UP (ref 0–1)
BUN SERPL-MCNC: 13 MG/DL — SIGNIFICANT CHANGE UP (ref 10–20)
CALCIUM SERPL-MCNC: 7.9 MG/DL — LOW (ref 8.5–10.1)
CHLORIDE SERPL-SCNC: 95 MMOL/L — LOW (ref 98–110)
CO2 SERPL-SCNC: 25 MMOL/L — SIGNIFICANT CHANGE UP (ref 17–32)
CREAT SERPL-MCNC: 0.7 MG/DL — SIGNIFICANT CHANGE UP (ref 0.7–1.5)
EOSINOPHIL # BLD AUTO: 0.07 K/UL — SIGNIFICANT CHANGE UP (ref 0–0.7)
EOSINOPHIL NFR BLD AUTO: 1.2 % — SIGNIFICANT CHANGE UP (ref 0–8)
GLUCOSE BLDC GLUCOMTR-MCNC: 143 MG/DL — HIGH (ref 70–99)
GLUCOSE BLDC GLUCOMTR-MCNC: 150 MG/DL — HIGH (ref 70–99)
GLUCOSE BLDC GLUCOMTR-MCNC: 157 MG/DL — HIGH (ref 70–99)
GLUCOSE BLDC GLUCOMTR-MCNC: 175 MG/DL — HIGH (ref 70–99)
GLUCOSE SERPL-MCNC: 141 MG/DL — HIGH (ref 70–99)
HCT VFR BLD CALC: 24.7 % — LOW (ref 42–52)
HGB BLD-MCNC: 8.5 G/DL — LOW (ref 14–18)
IMM GRANULOCYTES NFR BLD AUTO: 0.5 % — HIGH (ref 0.1–0.3)
INR BLD: 1.34 RATIO — HIGH (ref 0.65–1.3)
LYMPHOCYTES # BLD AUTO: 1.07 K/UL — LOW (ref 1.2–3.4)
LYMPHOCYTES # BLD AUTO: 17.9 % — LOW (ref 20.5–51.1)
MCHC RBC-ENTMCNC: 28.6 PG — SIGNIFICANT CHANGE UP (ref 27–31)
MCHC RBC-ENTMCNC: 34.4 G/DL — SIGNIFICANT CHANGE UP (ref 32–37)
MCV RBC AUTO: 83.2 FL — SIGNIFICANT CHANGE UP (ref 80–94)
MONOCYTES # BLD AUTO: 0.47 K/UL — SIGNIFICANT CHANGE UP (ref 0.1–0.6)
MONOCYTES NFR BLD AUTO: 7.8 % — SIGNIFICANT CHANGE UP (ref 1.7–9.3)
NEUTROPHILS # BLD AUTO: 4.33 K/UL — SIGNIFICANT CHANGE UP (ref 1.4–6.5)
NEUTROPHILS NFR BLD AUTO: 72.3 % — SIGNIFICANT CHANGE UP (ref 42.2–75.2)
PLATELET # BLD AUTO: 138 K/UL — SIGNIFICANT CHANGE UP (ref 130–400)
POTASSIUM SERPL-MCNC: 3.5 MMOL/L — SIGNIFICANT CHANGE UP (ref 3.5–5)
POTASSIUM SERPL-SCNC: 3.5 MMOL/L — SIGNIFICANT CHANGE UP (ref 3.5–5)
PROTHROM AB SERPL-ACNC: 15.4 SEC — HIGH (ref 9.95–12.87)
RBC # BLD: 2.97 M/UL — LOW (ref 4.7–6.1)
RBC # FLD: 11.9 % — SIGNIFICANT CHANGE UP (ref 11.5–14.5)
SODIUM SERPL-SCNC: 133 MMOL/L — LOW (ref 135–146)
WBC # BLD: 5.99 K/UL — SIGNIFICANT CHANGE UP (ref 4.8–10.8)
WBC # FLD AUTO: 5.99 K/UL — SIGNIFICANT CHANGE UP (ref 4.8–10.8)

## 2019-01-19 RX ORDER — FLUTICASONE PROPIONATE 50 MCG
1 SPRAY, SUSPENSION NASAL
Qty: 0 | Refills: 0 | Status: DISCONTINUED | OUTPATIENT
Start: 2019-01-19 | End: 2019-01-21

## 2019-01-19 RX ORDER — POTASSIUM CHLORIDE 20 MEQ
40 PACKET (EA) ORAL ONCE
Qty: 0 | Refills: 0 | Status: COMPLETED | OUTPATIENT
Start: 2019-01-19 | End: 2019-01-19

## 2019-01-19 RX ORDER — SIMETHICONE 80 MG/1
80 TABLET, CHEWABLE ORAL THREE TIMES A DAY
Qty: 0 | Refills: 0 | Status: DISCONTINUED | OUTPATIENT
Start: 2019-01-19 | End: 2019-01-21

## 2019-01-19 RX ORDER — METOPROLOL TARTRATE 50 MG
25 TABLET ORAL THREE TIMES A DAY
Qty: 0 | Refills: 0 | Status: DISCONTINUED | OUTPATIENT
Start: 2019-01-19 | End: 2019-01-21

## 2019-01-19 RX ADMIN — Medication 20 MILLIGRAM(S): at 11:27

## 2019-01-19 RX ADMIN — HEPARIN SODIUM 5000 UNIT(S): 5000 INJECTION INTRAVENOUS; SUBCUTANEOUS at 05:59

## 2019-01-19 RX ADMIN — Medication 100 MILLIGRAM(S): at 05:59

## 2019-01-19 RX ADMIN — Medication 5 UNIT(S): at 11:27

## 2019-01-19 RX ADMIN — Medication 1: at 07:32

## 2019-01-19 RX ADMIN — Medication 5 UNIT(S): at 07:32

## 2019-01-19 RX ADMIN — Medication 25 MILLIGRAM(S): at 21:39

## 2019-01-19 RX ADMIN — Medication 100 GRAM(S): at 05:58

## 2019-01-19 RX ADMIN — Medication 325 MILLIGRAM(S): at 11:27

## 2019-01-19 RX ADMIN — ATORVASTATIN CALCIUM 40 MILLIGRAM(S): 80 TABLET, FILM COATED ORAL at 21:39

## 2019-01-19 RX ADMIN — HEPARIN SODIUM 5000 UNIT(S): 5000 INJECTION INTRAVENOUS; SUBCUTANEOUS at 21:39

## 2019-01-19 RX ADMIN — CHLORHEXIDINE GLUCONATE 1 APPLICATION(S): 213 SOLUTION TOPICAL at 05:58

## 2019-01-19 RX ADMIN — Medication 100 MILLIGRAM(S): at 13:17

## 2019-01-19 RX ADMIN — Medication 100 GRAM(S): at 17:43

## 2019-01-19 RX ADMIN — Medication 1 SPRAY(S): at 21:47

## 2019-01-19 RX ADMIN — HEPARIN SODIUM 5000 UNIT(S): 5000 INJECTION INTRAVENOUS; SUBCUTANEOUS at 13:18

## 2019-01-19 RX ADMIN — Medication 1: at 11:27

## 2019-01-19 RX ADMIN — Medication 50 MILLIGRAM(S): at 05:59

## 2019-01-19 RX ADMIN — Medication 5 UNIT(S): at 17:43

## 2019-01-19 RX ADMIN — Medication 25 MILLIGRAM(S): at 13:19

## 2019-01-19 RX ADMIN — Medication 40 MILLIEQUIVALENT(S): at 06:47

## 2019-01-19 RX ADMIN — FAMOTIDINE 20 MILLIGRAM(S): 10 INJECTION INTRAVENOUS at 05:59

## 2019-01-19 RX ADMIN — Medication 100 MILLIGRAM(S): at 21:39

## 2019-01-19 RX ADMIN — INSULIN GLARGINE 15 UNIT(S): 100 INJECTION, SOLUTION SUBCUTANEOUS at 11:26

## 2019-01-19 RX ADMIN — FAMOTIDINE 20 MILLIGRAM(S): 10 INJECTION INTRAVENOUS at 17:43

## 2019-01-19 NOTE — PROGRESS NOTE ADULT - ASSESSMENT
PLAN  Neuro: move all 4 extremities.  Pain management done.   acetaminophen   Tablet .. 650 milliGRAM(s) Oral every 6 hours PRN  PARoxetine 20 milliGRAM(s) Oral daily    Pulm: Encourage coughing, deep breathing and use of incentive spirometry. Wean off supplemental oxygen as able. Daily CXR.     Cardio: Monitor telemetry/alarms. Continue supportive care   metoprolol tartrate 25 milliGRAM(s) Oral three times a day  niCARdipine Infusion 5 mG/Hr IV Continuous <Continuous>    GI: Tolerating diet. Continue stool softeners.    famotidine    Tablet 20 milliGRAM(s) Oral two times a day  simethicone 80 milliGRAM(s) Chew three times a day PRN    Nutrition: Continue cardiac, carb consistent diet as tolerated  Endocrine and glucose control:   atorvastatin 40 milliGRAM(s) Oral at bedtime  dextrose 40% Gel 15 Gram(s) Oral once PRN  glucagon  Injectable 1 milliGRAM(s) IntraMuscular once PRN  insulin glargine Injectable (LANTUS) 15 Unit(s) SubCutaneous every morning  insulin lispro (HumaLOG) corrective regimen sliding scale   SubCutaneous three times a day before meals  insulin lispro Injectable (HumaLOG) 5 Unit(s) SubCutaneous before breakfast  insulin lispro Injectable (HumaLOG) 5 Unit(s) SubCutaneous before lunch  insulin lispro Injectable (HumaLOG) 5 Unit(s) SubCutaneous before dinner    Renal: monitor urine output, supplement electrolytes as needed,     Vasc: Heparin SC and/or SCDs for DVT prophylaxis  Heme: 8.5 g/dL  8.6 g/dL      ID: Off antibiotics. Stable, no fever , no chills.     Tubes: Monitor Chest tube output  Therapy: OOB/ambulate  Disposition: start planing discharge home or placement    Pertinent clinical, laboratory, radiographic, hemodynamic, echocardiographic, respiratory data, microbiologic data and chart were reviewed and analyzed frequently throughout the course of the day and night. GI and DVT prophylaxis, glycemic control, head of bed elevation and skin care issues were addressed.  Patient seen, examined and plan discussed with CT Surgery / CTICU team during rounds.
Assessment/Plan:  CAD-s/p CABG x 3-POD #2  1-BP control-increase beta-blockers  2-serum glucose control-start Lantus/Novolog with insulin sliding scale correction regimen  3-fluid overload-diuresis  4-acute blood loss anemia/thrombocytopenia-stable, continue to monitor Hb/Hct/plts daily  7-ntxykwtxgro-tgnmndn potassium
Assessment/Plan:  CAD/NSTEMI-s/p CABG x 3-POD #1  1-BP control-start beta-blockers  2-serum glucose control-insulin infusion  3-acute blood loss anemia/thrombocytopenia-stable, continue to monitor Hb/Hct/plts daily
Assessment:  ·	NSTEMI. ACS ruled in  ·	Hyperlipidemia   ·	Depression    Plan:  ·	Telemetry. Aspirin, Plavix BB, anticoagulation, High intensity statin  ·	Will follow with cardiology for need for cardiac cath  ·	Awaiting 2D echo  ·	Will check hemoglobin A1c  ·	Continue Paxil  ·	OOB to chair
IMPRESSION    NSTEMI s/p cath, 3 vessel disease, pre-op for CABG   Depression     PLAN    CNS: Avoid CNS depressants     HEENT: Oral care     PULMONARY: Keep 02 sat > 92%     CARDIOVASCULAR: EKG w/ inverted T waves in anterior leads, + troponins (uptrending). Cath with evidence of 3 vessel disease, plan for CABG tomorrow with CTS. Continue aspirin, statin, therapeutic Lovenox, and BB. Follow up echo results.     GI: GI prophylaxis. DASH/TLC diet.     RENAL: Monitor electrolytes     ID: No evidence of active infection.     HEMATOLOGIC: DVT prophylaxis     ENDOCRINE: A1C 6.4 (pre-diabetic)     CCU monitoring  FULL CODE
Patient with acute coronary syndrome for a week. He at home had NSTEMI. He needs statin asa plavix beta. He needs a cardiac cath when stable. He needs a echo.
Patient with no chest pain. He had NSTEMI. OOB to chair . Continue meds. Echo. Will cath when stable
CTS ATTENDING    FEBRILE OVERNIGHT, POST CABGx3  EXTUBATED  ALERT, AWAKE AND COOPERATIVE  VITALS STABLE  MINIMAL DRAIN OUTPUTS  OFF PRESSOR INOTROPE    WILL REMOVE TUBES AND SWAN  OOB  ADVANCE DIET  MOBILIZE

## 2019-01-19 NOTE — PROGRESS NOTE ADULT - SUBJECTIVE AND OBJECTIVE BOX
CTU Attending Progress Daily Note     19 Jan 2019 12:10  Admited 01-12-19, Hospital Day 7d  POD# - 3    HPI:  67 yo M with PMHx of depression/ anxiety on Paxil, presents to ED with complaint of intermittent chest pain for 1 week's duration. Pt states symptoms are worse at night. Denies fever/chills, cough, hemoptysis, URI symptoms, abdominal pain, n/v/d, back pain, numbness, tingling, weakness, family hx of cardiac disease, hx of DVT/PE, recent sx, recent travel, trauma/injury. Chest pain described as sharp, intermittent, nonradiating, occurs at rest, occurs randomly, not worsened with exertion. Never had stress test, never smoker. No family history of cardiac disease. (13 Jan 2019 08:23)    Home Medications:  Paxil 20 mg oral tablet: 1 tab(s) orally once a day (12 Jan 2019 11:29)    FAMILY HISTORY:    PAST MEDICAL & SURGICAL HISTORY:  Depression  No significant past surgical history    Interval event for past 24 hr:  TYREL BENAVIDEZ  68y had no event.   Current Complains:  TYREL BENAVIDEZ has no new complains  Allergies    cats (Unknown)  No Known Drug Allergies    Intolerances      OBJECTIVE:  Vitals last 24 hrs  T(C): 37.1 (01-18-19 @ 22:00), Max: 37.7 (01-18-19 @ 16:00)  T(F): 98.7 (01-18-19 @ 22:00), Max: 99.9 (01-18-19 @ 16:00)  HR: 68 (01-19-19 @ 11:00) (64 - 86)  BP: 111/60 (01-19-19 @ 11:00) (97/66 - 123/66)  ABP: 110/44 (01-18-19 @ 14:00) (110/44 - 110/44)  ABP(mean): 64 (01-18-19 @ 14:00) (62 - 64)  RR: 28 (01-19-19 @ 11:00) (20 - 38)  SpO2: 96% (01-19-19 @ 11:00) (93% - 99%)      01-18-19 @ 07:01  -  01-19-19 @ 07:00  --------------------------------------------------------  IN:    insulin Infusion: 2 mL    IV PiggyBack: 300 mL    Oral Fluid: 510 mL    sodium chloride 0.9%: 10 mL  Total IN: 822 mL    OUT:    Indwelling Catheter - Urethral: 1221 mL    Voided: 1660 mL  Total OUT: 2881 mL    Total NET: -2059 mL          CAPILLARY BLOOD GLUCOSE  163 (18 Jan 2019 16:30)      POCT Blood Glucose.: 175 mg/dL (19 Jan 2019 11:22)  POCT Blood Glucose.: 157 mg/dL (19 Jan 2019 06:33)  POCT Blood Glucose.: 170 mg/dL (18 Jan 2019 22:20)  POCT Blood Glucose.: 163 mg/dL (18 Jan 2019 16:39)    LABS:  ABG - ( 18 Jan 2019 04:08 )  pH, Arterial: 7.46  pH, Blood: x     /  pCO2: 34    /  pO2: 59    / HCO3: 24    / Base Excess: 0.2   /  SaO2: 94                            8.5    5.99  )-----------( 138      ( 19 Jan 2019 02:00 )             24.7     Hemoglobin: 8.5 g/dL (01-19 @ 02:00)  Hemoglobin: 8.6 g/dL (01-18 @ 02:00)  Hemoglobin: 8.9 g/dL (01-17 @ 02:00)  Hemoglobin: 10.2 g/dL (01-16 @ 15:40)    01-19    133<L>  |  95<L>  |  13  ----------------------------<  141<H>  3.5   |  25  |  0.7    Ca    7.9<L>      19 Jan 2019 02:00  Mg     2.2     01-18      Creatinine, Serum: 0.7 mg/dL (01-19 @ 02:00)  Creatinine, Serum: 0.8 mg/dL (01-18 @ 02:00)  Creatinine, Serum: 0.8 mg/dL (01-17 @ 02:00)  Creatinine, Serum: 0.9 mg/dL (01-16 @ 15:40)  Creatinine, Serum: 0.8 mg/dL (01-16 @ 04:10)    PT/INR - ( 19 Jan 2019 02:00 )   PT: 15.40 sec;   INR: 1.34 ratio     PTT - ( 18 Jan 2019 02:00 )  PTT:30.3 sec      HOSPITAL MEDICATIONS:  MEDICATIONS  (STANDING):  aspirin enteric coated 325 milliGRAM(s) Oral daily  atorvastatin 40 milliGRAM(s) Oral at bedtime  chlorhexidine 4% Liquid 1 Application(s) Topical <User Schedule>  dextrose 5%. 1000 milliLiter(s) (50 mL/Hr) IV Continuous <Continuous>  dextrose 50% Injectable 50 milliLiter(s) IV Push every 15 minutes  dextrose 50% Injectable 25 milliLiter(s) IV Push every 15 minutes  docusate sodium 100 milliGRAM(s) Oral three times a day  famotidine    Tablet 20 milliGRAM(s) Oral two times a day  heparin  Injectable 5000 Unit(s) SubCutaneous every 8 hours  insulin glargine Injectable (LANTUS) 15 Unit(s) SubCutaneous every morning  insulin lispro (HumaLOG) corrective regimen sliding scale   SubCutaneous three times a day before meals  insulin lispro Injectable (HumaLOG) 5 Unit(s) SubCutaneous before breakfast  insulin lispro Injectable (HumaLOG) 5 Unit(s) SubCutaneous before lunch  insulin lispro Injectable (HumaLOG) 5 Unit(s) SubCutaneous before dinner  magnesium sulfate  IVPB 1 Gram(s) IV Intermittent every 12 hours  metoprolol tartrate 25 milliGRAM(s) Oral three times a day  niCARdipine Infusion 5 mG/Hr (25 mL/Hr) IV Continuous <Continuous>  PARoxetine 20 milliGRAM(s) Oral daily    MEDICATIONS  (PRN):  acetaminophen   Tablet .. 650 milliGRAM(s) Oral every 6 hours PRN Temp greater or equal to 38.5C (101.3F), Moderate Pain (4 - 6)  dextrose 40% Gel 15 Gram(s) Oral once PRN Blood Glucose LESS THAN 70 milliGRAM(s)/deciliter  glucagon  Injectable 1 milliGRAM(s) IntraMuscular once PRN Glucose LESS THAN 70 milligrams/deciliter  oxyCODONE    IR 5 milliGRAM(s) Oral every 4 hours PRN Moderate Pain (4 - 6)  oxyCODONE    IR 10 milliGRAM(s) Oral every 4 hours PRN Severe Pain (7 - 10)  simethicone 80 milliGRAM(s) Chew three times a day PRN Indigestion      REVIEW OF SYSTEMS:  CONSTITUTIONAL: [X] all negative; [ ] weakness, [ ] fevers, [ ] chills  EYES/ENT: [X] all negative; [ ] visual changes, [ ] vertigo, [ ] throat pain   NECK: [X] all negative; [ ] pain, [ ] stiffness  RESPIRATORY: [ ] all negative, [x ] cough, [ ] wheezing, [ ] hemoptysis, [x ] shortness of breath  CARDIOVASCULAR: [ ] all negative; [x ] chest pain, [ ] palpitations, [ ] orthopnea  GASTROINTESTINAL: [X] all negative; [ ]abdominal pain, [ ] nausea, [ ] vomiting, [ ] hematemesis, [ ] diarrhea, [ ] constipation, [ ] melena, [ ] hematochezia.  GENITOURINARY: [X] all negative; [ ] dysuria, [ ] frequency, [ ] hematuria  NEUROLOGICAL: [X] all negative; [ ] numbness, [ ] weakness  SKIN: [X] all negative; [ ] itching, [ ] burning, [ ] rashes, [ ] lesions   All other review of systems is negative unless indicated above.    [  ] Unable to assess ROS because     PHYSICAL EXAM:          CONSTITUTIONAL: Well-developed; well-nourished; in no acute distress.   	SKIN: warm, dry, no rashes or lesions  	HENT: Atrumatic. Normocephalic. PERRL. Moist membranes, no conj injection, sclera clear  	NECK: Supple; non tender.  No JVD. No lymphadenopathy.  	CARD: Normal S1, S2. Rate and Rythm are regular. No murmurs.  	RESP: CTA B/L. no wheezes, no rales no rhonchi.  	ABD: Soft, not tender, mildly distended, no CVA ttp no rebound no guarding, bowel sounds present  	EXT: Normal ROM.  No clubbing, no cyanosis, 1+ pedal edema, no calf pain b/l, Peripheral pulses intact.  	LYMPH: No acute cervical adenopathy.  	NEURO: Alert, awake, motor 5/5 R, 5/5 L, sensation intact bilat, CN 2-12 intact,          PSYCH: Cooperative, appropriate. Alert & oriented x 3    RADIOLOGY:  X Reviewed and interpreted by me  CxR from 01-19-19 shows moderate congestion, no pneumothorax, no effusion, no cardiomegaly,       ECG:  X Reviewed and interpreted by me - NSR

## 2019-01-19 NOTE — PROGRESS NOTE ADULT - ATTENDING COMMENTS
POD 3 after CABG    doing well  cont ASA/SQH/BB/statin  cont mild Diuresis, negative 2000cc/24hrs  Cont pulmonary toilet, Chest PT, pain control, Incentive spirometry  OOB ambulate  case d/w CTU team  dispo planning POD 3 after CABG    doing well  cont ASA/SQH/BB/statin  hold off on duresis, negative 2000cc/24hrs  Cont pulmonary toilet, Chest PT, pain control, Incentive spirometry  OOB ambulate  case d/w CTU team  dispo planning POD 3 after CABG    doing well  cont ASA/SQH/BB/statin  decrease bb to 25q8  cont mild diuresis, negative 2000cc/24hrs  Cont pulmonary toilet, Chest PT, pain control, Incentive spirometry  OOB ambulate  case d/w CTU team  dispo planning

## 2019-01-19 NOTE — PROGRESS NOTE ADULT - SUBJECTIVE AND OBJECTIVE BOX
OPERATIVE PROCEDURE(s):   CABG            POD #3    SURGEON(s): Vidal    SUBJECTIVE ASSESSMENT:  pt doesn't have any major complaints    Vital Signs Last 24 Hrs  T(C): 37.1 (18 Jan 2019 22:00), Max: 37.7 (18 Jan 2019 12:00)  T(F): 98.7 (18 Jan 2019 22:00), Max: 99.9 (18 Jan 2019 12:00)  HR: 72 (19 Jan 2019 06:00) (70 - 86)  BP: 105/68 (19 Jan 2019 06:00) (97/66 - 123/66)  BP(mean): 79 (19 Jan 2019 06:00) (61 - 86)  RR: 31 (19 Jan 2019 06:00) (20 - 38)  SpO2: 93% (19 Jan 2019 06:00) (92% - 97%)  01-18-19 @ 07:01  -  01-19-19 @ 07:00  --------------------------------------------------------  IN: 822 mL / OUT: 2881 mL / NET: -2059 mL    A&OX3 in NAD  CTA bilat  sternum stable, no drainage  nl s1, s2  abd soft/NT/ND  no peripheral edema  SVG harvest site is healing well    LABS:                        8.5    5.99  )-----------( 138      ( 19 Jan 2019 02:00 )             24.7     PT/INR - ( 19 Jan 2019 02:00 )   PT: 15.40 sec;   INR: 1.34 ratio       PTT - ( 18 Jan 2019 02:00 )  PTT:30.3 sec  01-19    133<L>  |  95<L>  |  13  ----------------------------<  141<H>  3.5   |  25  |  0.7    Ca    7.9<L>      19 Jan 2019 02:00  Mg     2.2     01-18    MEDICATIONS  (STANDING):  aspirin enteric coated 325 milliGRAM(s) Oral daily  atorvastatin 40 milliGRAM(s) Oral at bedtime  docusate sodium 100 milliGRAM(s) Oral three times a day  famotidine    Tablet 20 milliGRAM(s) Oral two times a day  heparin  Injectable 5000 Unit(s) SubCutaneous every 8 hours  insulin glargine Injectable (LANTUS) 15 Unit(s) SubCutaneous every morning  insulin lispro (HumaLOG) corrective regimen sliding scale   SubCutaneous three times a day before meals  insulin lispro Injectable (HumaLOG) 5 Unit(s) SubCutaneous before breakfast  insulin lispro Injectable (HumaLOG) 5 Unit(s) SubCutaneous before lunch  insulin lispro Injectable (HumaLOG) 5 Unit(s) SubCutaneous before dinner  magnesium sulfate  IVPB 1 Gram(s) IV Intermittent every 12 hours  metoprolol tartrate 50 milliGRAM(s) Oral two times a day  niCARdipine Infusion 5 mG/Hr (25 mL/Hr) IV Continuous <Continuous>  PARoxetine 20 milliGRAM(s) Oral daily    MEDICATIONS  (PRN):  acetaminophen   Tablet .. 650 milliGRAM(s) Oral every 6 hours PRN Temp greater or equal to 38.5C (101.3F), Moderate Pain (4 - 6)  dextrose 40% Gel 15 Gram(s) Oral once PRN Blood Glucose LESS THAN 70 milliGRAM(s)/deciliter  glucagon  Injectable 1 milliGRAM(s) IntraMuscular once PRN Glucose LESS THAN 70 milligrams/deciliter  oxyCODONE    IR 5 milliGRAM(s) Oral every 4 hours PRN Moderate Pain (4 - 6)  oxyCODONE    IR 10 milliGRAM(s) Oral every 4 hours PRN Severe Pain (7 - 10)

## 2019-01-20 LAB
ANION GAP SERPL CALC-SCNC: 15 MMOL/L — HIGH (ref 7–14)
ANION GAP SERPL CALC-SCNC: 16 MMOL/L — HIGH (ref 7–14)
BASOPHILS # BLD AUTO: 0.02 K/UL — SIGNIFICANT CHANGE UP (ref 0–0.2)
BASOPHILS NFR BLD AUTO: 0.3 % — SIGNIFICANT CHANGE UP (ref 0–1)
BUN SERPL-MCNC: 13 MG/DL — SIGNIFICANT CHANGE UP (ref 10–20)
BUN SERPL-MCNC: 16 MG/DL — SIGNIFICANT CHANGE UP (ref 10–20)
CALCIUM SERPL-MCNC: 8.2 MG/DL — LOW (ref 8.5–10.1)
CALCIUM SERPL-MCNC: 8.7 MG/DL — SIGNIFICANT CHANGE UP (ref 8.5–10.1)
CHLORIDE SERPL-SCNC: 103 MMOL/L — SIGNIFICANT CHANGE UP (ref 98–110)
CHLORIDE SERPL-SCNC: 99 MMOL/L — SIGNIFICANT CHANGE UP (ref 98–110)
CO2 SERPL-SCNC: 24 MMOL/L — SIGNIFICANT CHANGE UP (ref 17–32)
CO2 SERPL-SCNC: 25 MMOL/L — SIGNIFICANT CHANGE UP (ref 17–32)
CREAT SERPL-MCNC: 0.8 MG/DL — SIGNIFICANT CHANGE UP (ref 0.7–1.5)
CREAT SERPL-MCNC: 0.9 MG/DL — SIGNIFICANT CHANGE UP (ref 0.7–1.5)
EOSINOPHIL # BLD AUTO: 0.14 K/UL — SIGNIFICANT CHANGE UP (ref 0–0.7)
EOSINOPHIL NFR BLD AUTO: 2.4 % — SIGNIFICANT CHANGE UP (ref 0–8)
GLUCOSE BLDC GLUCOMTR-MCNC: 104 MG/DL — HIGH (ref 70–99)
GLUCOSE BLDC GLUCOMTR-MCNC: 118 MG/DL — HIGH (ref 70–99)
GLUCOSE BLDC GLUCOMTR-MCNC: 174 MG/DL — HIGH (ref 70–99)
GLUCOSE BLDC GLUCOMTR-MCNC: 213 MG/DL — HIGH (ref 70–99)
GLUCOSE SERPL-MCNC: 112 MG/DL — HIGH (ref 70–99)
GLUCOSE SERPL-MCNC: 145 MG/DL — HIGH (ref 70–99)
HCT VFR BLD CALC: 26.1 % — LOW (ref 42–52)
HCT VFR BLD CALC: 29.1 % — LOW (ref 42–52)
HGB BLD-MCNC: 8.9 G/DL — LOW (ref 14–18)
HGB BLD-MCNC: 9.9 G/DL — LOW (ref 14–18)
IMM GRANULOCYTES NFR BLD AUTO: 0.5 % — HIGH (ref 0.1–0.3)
INR BLD: 1.19 RATIO — SIGNIFICANT CHANGE UP (ref 0.65–1.3)
LYMPHOCYTES # BLD AUTO: 1.26 K/UL — SIGNIFICANT CHANGE UP (ref 1.2–3.4)
LYMPHOCYTES # BLD AUTO: 21.3 % — SIGNIFICANT CHANGE UP (ref 20.5–51.1)
MCHC RBC-ENTMCNC: 28.3 PG — SIGNIFICANT CHANGE UP (ref 27–31)
MCHC RBC-ENTMCNC: 28.5 PG — SIGNIFICANT CHANGE UP (ref 27–31)
MCHC RBC-ENTMCNC: 34 G/DL — SIGNIFICANT CHANGE UP (ref 32–37)
MCHC RBC-ENTMCNC: 34.1 G/DL — SIGNIFICANT CHANGE UP (ref 32–37)
MCV RBC AUTO: 82.9 FL — SIGNIFICANT CHANGE UP (ref 80–94)
MCV RBC AUTO: 83.9 FL — SIGNIFICANT CHANGE UP (ref 80–94)
MONOCYTES # BLD AUTO: 0.53 K/UL — SIGNIFICANT CHANGE UP (ref 0.1–0.6)
MONOCYTES NFR BLD AUTO: 9 % — SIGNIFICANT CHANGE UP (ref 1.7–9.3)
NEUTROPHILS # BLD AUTO: 3.93 K/UL — SIGNIFICANT CHANGE UP (ref 1.4–6.5)
NEUTROPHILS NFR BLD AUTO: 66.5 % — SIGNIFICANT CHANGE UP (ref 42.2–75.2)
NRBC # BLD: 0 /100 WBCS — SIGNIFICANT CHANGE UP (ref 0–0)
PLATELET # BLD AUTO: 187 K/UL — SIGNIFICANT CHANGE UP (ref 130–400)
PLATELET # BLD AUTO: 243 K/UL — SIGNIFICANT CHANGE UP (ref 130–400)
POTASSIUM SERPL-MCNC: 3.7 MMOL/L — SIGNIFICANT CHANGE UP (ref 3.5–5)
POTASSIUM SERPL-MCNC: 3.9 MMOL/L — SIGNIFICANT CHANGE UP (ref 3.5–5)
POTASSIUM SERPL-SCNC: 3.7 MMOL/L — SIGNIFICANT CHANGE UP (ref 3.5–5)
POTASSIUM SERPL-SCNC: 3.9 MMOL/L — SIGNIFICANT CHANGE UP (ref 3.5–5)
PROTHROM AB SERPL-ACNC: 13.7 SEC — HIGH (ref 9.95–12.87)
RBC # BLD: 3.15 M/UL — LOW (ref 4.7–6.1)
RBC # BLD: 3.47 M/UL — LOW (ref 4.7–6.1)
RBC # FLD: 12.2 % — SIGNIFICANT CHANGE UP (ref 11.5–14.5)
RBC # FLD: 12.3 % — SIGNIFICANT CHANGE UP (ref 11.5–14.5)
SODIUM SERPL-SCNC: 139 MMOL/L — SIGNIFICANT CHANGE UP (ref 135–146)
SODIUM SERPL-SCNC: 143 MMOL/L — SIGNIFICANT CHANGE UP (ref 135–146)
WBC # BLD: 5.17 K/UL — SIGNIFICANT CHANGE UP (ref 4.8–10.8)
WBC # BLD: 5.91 K/UL — SIGNIFICANT CHANGE UP (ref 4.8–10.8)
WBC # FLD AUTO: 5.17 K/UL — SIGNIFICANT CHANGE UP (ref 4.8–10.8)
WBC # FLD AUTO: 5.91 K/UL — SIGNIFICANT CHANGE UP (ref 4.8–10.8)

## 2019-01-20 RX ORDER — POTASSIUM CHLORIDE 20 MEQ
40 PACKET (EA) ORAL ONCE
Qty: 0 | Refills: 0 | Status: COMPLETED | OUTPATIENT
Start: 2019-01-20 | End: 2019-01-20

## 2019-01-20 RX ORDER — POTASSIUM CHLORIDE 20 MEQ
20 PACKET (EA) ORAL ONCE
Qty: 0 | Refills: 0 | Status: COMPLETED | OUTPATIENT
Start: 2019-01-20 | End: 2019-01-20

## 2019-01-20 RX ORDER — FUROSEMIDE 40 MG
40 TABLET ORAL ONCE
Qty: 0 | Refills: 0 | Status: COMPLETED | OUTPATIENT
Start: 2019-01-20 | End: 2019-01-20

## 2019-01-20 RX ADMIN — FAMOTIDINE 20 MILLIGRAM(S): 10 INJECTION INTRAVENOUS at 17:05

## 2019-01-20 RX ADMIN — HEPARIN SODIUM 5000 UNIT(S): 5000 INJECTION INTRAVENOUS; SUBCUTANEOUS at 13:36

## 2019-01-20 RX ADMIN — Medication 100 MILLIGRAM(S): at 13:36

## 2019-01-20 RX ADMIN — INSULIN GLARGINE 15 UNIT(S): 100 INJECTION, SOLUTION SUBCUTANEOUS at 08:42

## 2019-01-20 RX ADMIN — Medication 1: at 08:39

## 2019-01-20 RX ADMIN — HEPARIN SODIUM 5000 UNIT(S): 5000 INJECTION INTRAVENOUS; SUBCUTANEOUS at 05:36

## 2019-01-20 RX ADMIN — Medication 325 MILLIGRAM(S): at 11:06

## 2019-01-20 RX ADMIN — Medication 1 SPRAY(S): at 17:03

## 2019-01-20 RX ADMIN — Medication 2: at 11:25

## 2019-01-20 RX ADMIN — Medication 40 MILLIGRAM(S): at 09:50

## 2019-01-20 RX ADMIN — Medication 25 MILLIGRAM(S): at 22:29

## 2019-01-20 RX ADMIN — HEPARIN SODIUM 5000 UNIT(S): 5000 INJECTION INTRAVENOUS; SUBCUTANEOUS at 22:29

## 2019-01-20 RX ADMIN — Medication 20 MILLIGRAM(S): at 11:06

## 2019-01-20 RX ADMIN — Medication 100 MILLIGRAM(S): at 05:36

## 2019-01-20 RX ADMIN — Medication 100 MILLIGRAM(S): at 22:29

## 2019-01-20 RX ADMIN — Medication 5 UNIT(S): at 16:43

## 2019-01-20 RX ADMIN — Medication 25 MILLIGRAM(S): at 11:06

## 2019-01-20 RX ADMIN — Medication 20 MILLIEQUIVALENT(S): at 06:35

## 2019-01-20 RX ADMIN — Medication 100 GRAM(S): at 17:04

## 2019-01-20 RX ADMIN — Medication 1 SPRAY(S): at 05:37

## 2019-01-20 RX ADMIN — Medication 5 UNIT(S): at 11:26

## 2019-01-20 RX ADMIN — Medication 40 MILLIEQUIVALENT(S): at 10:27

## 2019-01-20 RX ADMIN — FAMOTIDINE 20 MILLIGRAM(S): 10 INJECTION INTRAVENOUS at 05:36

## 2019-01-20 RX ADMIN — Medication 100 GRAM(S): at 05:36

## 2019-01-20 RX ADMIN — Medication 5 UNIT(S): at 08:40

## 2019-01-20 RX ADMIN — ATORVASTATIN CALCIUM 40 MILLIGRAM(S): 80 TABLET, FILM COATED ORAL at 22:29

## 2019-01-20 NOTE — PROGRESS NOTE ADULT - SUBJECTIVE AND OBJECTIVE BOX
SUBJECTIVE ASSESSMENT:  pt feels better    Vital Signs Last 24 Hrs  T(C): 36.7 (20 Jan 2019 08:00), Max: 37.2 (19 Jan 2019 20:00)  T(F): 98.1 (20 Jan 2019 08:00), Max: 98.9 (19 Jan 2019 20:00)  HR: 72 (20 Jan 2019 08:00) (68 - 80)  BP: 110/70 (20 Jan 2019 08:00) (91/63 - 140/77)  BP(mean): 78 (20 Jan 2019 08:00) (69 - 101)  RR: 31 (20 Jan 2019 08:00) (20 - 35)  SpO2: 92% (20 Jan 2019 08:00) (90% - 99%)  01-19-19 @ 07:01  -  01-20-19 @ 07:00  --------------------------------------------------------  IN: 350 mL / OUT: 950 mL / NET: -600 mL    A&OX3 in NAD  CTA bilat  sternum stable, no drainage  nl s1, s2  abd soft/NT/ND  no peripheral edema  SVG harvest site is healing well  radial artery harvest site is healing well     LABS:                        8.9    5.91  )-----------( 187      ( 20 Jan 2019 02:00 )             26.1     PT/INR - ( 20 Jan 2019 02:00 )   PT: 13.70 sec;   INR: 1.19 ratio    01-20    139  |  99  |  13  ----------------------------<  112<H>  3.7   |  24  |  0.8    Ca    8.2<L>      20 Jan 2019 02:00    MEDICATIONS  (STANDING):  aspirin enteric coated 325 milliGRAM(s) Oral daily  atorvastatin 40 milliGRAM(s) Oral at bedtime  docusate sodium 100 milliGRAM(s) Oral three times a day  famotidine    Tablet 20 milliGRAM(s) Oral two times a day  fluticasone propionate 50 MICROgram(s)/spray Nasal Spray 1 Spray(s) Both Nostrils two times a day  heparin  Injectable 5000 Unit(s) SubCutaneous every 8 hours  insulin glargine Injectable (LANTUS) 15 Unit(s) SubCutaneous every morning  insulin lispro (HumaLOG) corrective regimen sliding scale   SubCutaneous three times a day before meals  insulin lispro Injectable (HumaLOG) 5 Unit(s) SubCutaneous before breakfast  insulin lispro Injectable (HumaLOG) 5 Unit(s) SubCutaneous before lunch  insulin lispro Injectable (HumaLOG) 5 Unit(s) SubCutaneous before dinner  magnesium sulfate  IVPB 1 Gram(s) IV Intermittent every 12 hours  metoprolol tartrate 25 milliGRAM(s) Oral three times a day  PARoxetine 20 milliGRAM(s) Oral daily    MEDICATIONS  (PRN):  acetaminophen   Tablet .. 650 milliGRAM(s) Oral every 6 hours PRN Temp greater or equal to 38.5C (101.3F), Moderate Pain (4 - 6)  dextrose 40% Gel 15 Gram(s) Oral once PRN Blood Glucose LESS THAN 70 milliGRAM(s)/deciliter  glucagon  Injectable 1 milliGRAM(s) IntraMuscular once PRN Glucose LESS THAN 70 milligrams/deciliter  oxyCODONE    IR 5 milliGRAM(s) Oral every 4 hours PRN Moderate Pain (4 - 6)  oxyCODONE    IR 10 milliGRAM(s) Oral every 4 hours PRN Severe Pain (7 - 10)  simethicone 80 milliGRAM(s) Chew three times a day PRN Indigestion

## 2019-01-20 NOTE — PROGRESS NOTE ADULT - SUBJECTIVE AND OBJECTIVE BOX
69 yo Male with PAST MEDICAL & SURGICAL HISTORY: non smoker, with depression/ anxiety on Paxil. He presented to ED w/ sharp, non-radiating chest pain for 1 wekk which occurs at rest, randomly, not worsened with exertion. Stated his symptoms are worse at night. Denies fever/chills, cough, hemoptysis, URI symptoms, abdominal pain, n/v/d, back pain, numbness, tingling, weakness, smoking, cardiac hx/sx, family hx of cardiac disease, hx of DVT/PE, recent sx, recent travel, trauma/injury. No smoking or alcohol or family history of CVD.   In ED found to have high troponin with EKG changes - NSTEMI. Cath - 3v CAD w/preserved EF.    1/16/2019 - s/p CABG x3    Vital Signs Last 24 Hrs  T(C): 36.7 (20 Jan 2019 08:00), Max: 37.2 (19 Jan 2019 20:00)  T(F): 98.1 (20 Jan 2019 08:00), Max: 98.9 (19 Jan 2019 20:00)  HR: 78 (20 Jan 2019 11:00) (68 - 80)  BP: 137/78 (20 Jan 2019 11:00) (91/63 - 140/77)  BP(mean): 93 (20 Jan 2019 11:00) (69 - 101)  RR: 25 (20 Jan 2019 11:00) (20 - 35)  SpO2: 92% (20 Jan 2019 11:00) (90% - 95%)    alert, awake, verbal  clean sternal incision  s1s2 reg rate  b/l air entry, no wheeze, few b/l rhonchi  soft abdomen, non tender, non distended  warm ext, no edema, + distal pulses    WBC 5.9, hg 8.9, plt 187  Cr 0.6, K 3.7    CXR - b/l pleural effusions left > right. B/l congestion    a/p:    CAD with NSTEMI, POD # 4 CABG x3  fluid overload, left pleural effusion  acute post thoracotomy pain    lasix 40 mg today. goal is negative fluid balance.  replace lytes PRN  PO ASA, statin, metoprolol 25 TID  PO paroxetine  Pain meds PO PRN  glycemic control with lantus and novolog  encourage ambulation

## 2019-01-20 NOTE — PROGRESS NOTE ADULT - ATTENDING COMMENTS
POD 4 after CABG  doing well  cont ASA/SQH/BB/statin  cont mild diuresis, negative 600cc  Cont pulmonary toilet, Chest PT, pain control, Incentive spirometry  OOB ambulate  case d/w CTU team  dispo planning

## 2019-01-21 VITALS — HEART RATE: 80 BPM | SYSTOLIC BLOOD PRESSURE: 108 MMHG | DIASTOLIC BLOOD PRESSURE: 66 MMHG | RESPIRATION RATE: 32 BRPM

## 2019-01-21 LAB
GLUCOSE BLDC GLUCOMTR-MCNC: 134 MG/DL — HIGH (ref 70–99)
GLUCOSE BLDC GLUCOMTR-MCNC: 171 MG/DL — HIGH (ref 70–99)
INR BLD: 1.21 RATIO — SIGNIFICANT CHANGE UP (ref 0.65–1.3)
PROTHROM AB SERPL-ACNC: 13.9 SEC — HIGH (ref 9.95–12.87)

## 2019-01-21 RX ORDER — ASPIRIN/CALCIUM CARB/MAGNESIUM 324 MG
1 TABLET ORAL
Qty: 30 | Refills: 0
Start: 2019-01-21

## 2019-01-21 RX ORDER — DOCUSATE SODIUM 100 MG
1 CAPSULE ORAL
Qty: 90 | Refills: 0
Start: 2019-01-21

## 2019-01-21 RX ORDER — METOPROLOL TARTRATE 50 MG
1 TABLET ORAL
Qty: 60 | Refills: 0
Start: 2019-01-21

## 2019-01-21 RX ORDER — FAMOTIDINE 10 MG/ML
1 INJECTION INTRAVENOUS
Qty: 60 | Refills: 0
Start: 2019-01-21

## 2019-01-21 RX ORDER — ATORVASTATIN CALCIUM 80 MG/1
1 TABLET, FILM COATED ORAL
Qty: 30 | Refills: 0
Start: 2019-01-21

## 2019-01-21 RX ORDER — POTASSIUM CHLORIDE 20 MEQ
1 PACKET (EA) ORAL
Qty: 10 | Refills: 0
Start: 2019-01-21 | End: 2019-01-30

## 2019-01-21 RX ORDER — FUROSEMIDE 40 MG
1 TABLET ORAL
Qty: 10 | Refills: 0
Start: 2019-01-21 | End: 2019-01-30

## 2019-01-21 RX ORDER — FLUTICASONE PROPIONATE 50 MCG
1 SPRAY, SUSPENSION NASAL
Qty: 1 | Refills: 0
Start: 2019-01-21

## 2019-01-21 RX ADMIN — Medication 100 MILLIGRAM(S): at 05:52

## 2019-01-21 RX ADMIN — Medication 5 UNIT(S): at 11:33

## 2019-01-21 RX ADMIN — HEPARIN SODIUM 5000 UNIT(S): 5000 INJECTION INTRAVENOUS; SUBCUTANEOUS at 05:52

## 2019-01-21 RX ADMIN — Medication 5 UNIT(S): at 08:10

## 2019-01-21 RX ADMIN — Medication 100 MILLIGRAM(S): at 14:38

## 2019-01-21 RX ADMIN — FAMOTIDINE 20 MILLIGRAM(S): 10 INJECTION INTRAVENOUS at 05:52

## 2019-01-21 RX ADMIN — INSULIN GLARGINE 15 UNIT(S): 100 INJECTION, SOLUTION SUBCUTANEOUS at 08:08

## 2019-01-21 RX ADMIN — Medication 1: at 08:09

## 2019-01-21 RX ADMIN — Medication 100 GRAM(S): at 05:54

## 2019-01-21 RX ADMIN — Medication 325 MILLIGRAM(S): at 11:33

## 2019-01-21 RX ADMIN — Medication 20 MILLIGRAM(S): at 11:33

## 2019-01-21 RX ADMIN — Medication 25 MILLIGRAM(S): at 14:38

## 2019-01-21 RX ADMIN — CHLORHEXIDINE GLUCONATE 1 APPLICATION(S): 213 SOLUTION TOPICAL at 05:53

## 2019-01-21 RX ADMIN — Medication 1 SPRAY(S): at 05:53

## 2019-01-21 RX ADMIN — Medication 25 MILLIGRAM(S): at 05:52

## 2019-01-21 NOTE — PROGRESS NOTE ADULT - SUBJECTIVE AND OBJECTIVE BOX
OPERATIVE PROCEDURE(s):  cabg x 3              POD # 5    SURGEON(s): florinda     SUBJECTIVE ASSESSMENT:  pt is without complaints and walked well with PT and wants to go home    Vital Signs Last 24 Hrs  T(C): 37.2 (21 Jan 2019 08:05), Max: 37.2 (21 Jan 2019 08:05)  T(F): 99 (21 Jan 2019 08:05), Max: 99 (21 Jan 2019 08:05)  HR: 74 (21 Jan 2019 11:00) (66 - 80)  BP: 93/63 (21 Jan 2019 11:00) (93/63 - 113/71)  BP(mean): 68 (21 Jan 2019 11:00) (67 - 89)  RR: 28 (21 Jan 2019 11:00) (18 - 30)  SpO2: 98% (21 Jan 2019 11:00) (93% - 98%)  01-20-19 @ 07:01  -  01-21-19 @ 07:00  --------------------------------------------------------  IN: 1000 mL / OUT: 2320 mL / NET: -1320 mL    01-21-19 @ 07:01  - 01-21-19 @ 12:15  --------------------------------------------------------  IN: 150 mL / OUT: 175 mL / NET: -25 mL        Physical Exam  General: alert and oriented x 3  Chest: cta bl  CVS: s1s2  Abd: pos bs, soft, nt  GI/ :  Ext: no sig edema    Central Venous Catheter: Yes[ ]  No[x ] , If Yes indication:           Day #    Prasad Catheter: Yes  [ ] , No [x ] : If yes indication:                         Day #    NGT: Yes [ ] No [x  ]     If Yes Placement:                                          Day #    Post-Op Beta-Blockers: Yes [x ], No[ ], If No, then contraindication:    CHEST TUBE:  [ ] YES [x ] NO  OUTPUT:     per 24 hours    AIR LEAKS:  [ ] YES [ ] NO      EPICARDIAL WIRES:  [ ] YES [x ] NO      BOWEL MOVEMENT:  [ ] YES [ x] NO          LABS:                        9.9    5.17  )-----------( 243      ( 20 Jan 2019 23:14 )             29.1     COUMADIN:   [ ] YES [x ] NO    PT/INR - ( 21 Jan 2019 01:50 )   PT: 13.90 sec;   INR: 1.21 ratio         PTT - ( 21 Jan 2019 01:50 )  PTT:28.2 sec  01-20    143  |  103  |  16  ----------------------------<  145<H>  3.9   |  25  |  0.9    Ca    8.7      20 Jan 2019 23:14  Mg     2.0     01-20          MEDICATIONS  (STANDING):  aspirin enteric coated 325 milliGRAM(s) Oral daily  atorvastatin 40 milliGRAM(s) Oral at bedtime  chlorhexidine 4% Liquid 1 Application(s) Topical <User Schedule>  dextrose 5%. 1000 milliLiter(s) (50 mL/Hr) IV Continuous <Continuous>  dextrose 50% Injectable 50 milliLiter(s) IV Push every 15 minutes  dextrose 50% Injectable 25 milliLiter(s) IV Push every 15 minutes  docusate sodium 100 milliGRAM(s) Oral three times a day  famotidine    Tablet 20 milliGRAM(s) Oral two times a day  fluticasone propionate 50 MICROgram(s)/spray Nasal Spray 1 Spray(s) Both Nostrils two times a day  heparin  Injectable 5000 Unit(s) SubCutaneous every 8 hours  insulin glargine Injectable (LANTUS) 15 Unit(s) SubCutaneous every morning  insulin lispro (HumaLOG) corrective regimen sliding scale   SubCutaneous three times a day before meals  insulin lispro Injectable (HumaLOG) 5 Unit(s) SubCutaneous before breakfast  insulin lispro Injectable (HumaLOG) 5 Unit(s) SubCutaneous before lunch  insulin lispro Injectable (HumaLOG) 5 Unit(s) SubCutaneous before dinner  magnesium sulfate  IVPB 1 Gram(s) IV Intermittent every 12 hours  metoprolol tartrate 25 milliGRAM(s) Oral three times a day  niCARdipine Infusion 5 mG/Hr (25 mL/Hr) IV Continuous <Continuous>  PARoxetine 20 milliGRAM(s) Oral daily    MEDICATIONS  (PRN):  acetaminophen   Tablet .. 650 milliGRAM(s) Oral every 6 hours PRN Temp greater or equal to 38.5C (101.3F), Moderate Pain (4 - 6)  dextrose 40% Gel 15 Gram(s) Oral once PRN Blood Glucose LESS THAN 70 milliGRAM(s)/deciliter  glucagon  Injectable 1 milliGRAM(s) IntraMuscular once PRN Glucose LESS THAN 70 milligrams/deciliter  oxyCODONE    IR 5 milliGRAM(s) Oral every 4 hours PRN Moderate Pain (4 - 6)  oxyCODONE    IR 10 milliGRAM(s) Oral every 4 hours PRN Severe Pain (7 - 10)  simethicone 80 milliGRAM(s) Chew three times a day PRN Indigestion      Allergies    cats (Unknown)  No Known Drug Allergies    Intolerances    Ambulation/Activity Status:  pt ambulated well with pt      RADIOLOGY & ADDITIONAL TESTS:  cxr  EXAM:  XR CHEST PORTABLE ROUTINE 1V          PROCEDURE DATE:  01/20/2019      INTERPRETATION:  Clinical History / Reason for exam: Shortness of breath    Comparison : Chest radiograph 1/19/2019.    Technique/Positioning: Low lung volumes.    Findings:    Support devices: Removed right IJ sheath.    Cardiac/mediastinum/hilum: Stable cardiac silhouette post sternotomy    Lung parenchyma/Pleura: No significant interval change in basilar   opacities  with blunted costophrenic angles. No pneumothorax    Skeleton/soft tissues: Stable    Impression:    No significant interval change in basilar opacities with blunted  costophrenic angles.    No pneumothorax    Assessment/Plan: Pt is a 69 yo male s/p cabg x 3 POD # 5  cont tx as per ct surgeon  s/p cabg- cont asa, lopressor, and lipitor  d/c home   gentle diuresis    Social Service- home today with vns

## 2019-01-21 NOTE — PROGRESS NOTE ADULT - REASON FOR ADMISSION
chest pain
NSTEMI
chest pain
chest pain, elevated troponins and ischemic changes on EKG
nstemi
chest pain
s/p CABG
chest pain

## 2019-01-21 NOTE — PROGRESS NOTE ADULT - SUBJECTIVE AND OBJECTIVE BOX
CTU Attending Progress Daily Note     21 Jan 2019 08:14  POD#   5            Procedure:  CABG 3 LIMA    Patient seen as post-op critical care follow-up    HPI:  69 yo M with PMHx of depression/ anxiety on Paxil, presents to ED with complaint of intermittent chest pain for 1 week's duration. Pt states symptoms are worse at night. Denies fever/chills, cough, hemoptysis, URI symptoms, abdominal pain, n/v/d, back pain, numbness, tingling, weakness, family hx of cardiac disease, hx of DVT/PE, recent sx, recent travel, trauma/injury. Chest pain described as sharp, intermittent, nonradiating, occurs at rest, occurs randomly, not worsened with exertion. Never had stress test, never smoker. No family history of cardiac disease. (13 Jan 2019 08:23)      Interval event for past 24 hr:  TYREL BENAVIDEZ  68y had no event.     Current Complains:  TYREL BENAVIDEZ has no new complaints    REVIEW OF SYSTEMS:  CONSTITUTIONAL:  [-] weakness, [-] fevers, [-] chills  EYES/ENT: [-] visual changes, [-] vertigo, [-] throat pain   NECK: [-] pain, [-] stiffness  RESPIRATORY: [-] cough, [-] wheezing, [-] hemoptysis, [-] shortness of breath  CARDIOVASCULAR: [-] chest pain, [-] palpitations, [-] orthopnea  GASTROINTESTINAL:    [-]abdominal pain, [-] nausea, [-] vomiting, [-] hematemesis, [-] diarrhea, [-] constipation, [-] melena, [-] hematochezia.  GENITOURINARY: [-] dysuria, [-] frequency, [-] hematuria  NEUROLOGICAL: [-] numbness, [-] weakness  SKIN: [-] itching, [-] burning, [-] rashes, [-] lesions   All other review of systems is negative unless indicated above.    [  ] Unable to assess ROS because :    OBJECTIVE:  ICU Vital Signs Last 24 Hrs  T(C): 36.8 (20 Jan 2019 19:20), Max: 36.8 (20 Jan 2019 19:20)  T(F): 98.2 (20 Jan 2019 19:20), Max: 98.2 (20 Jan 2019 19:20)  HR: 70 (21 Jan 2019 08:05) (66 - 80)  BP: 101/62 (21 Jan 2019 08:05) (99/60 - 140/70)  BP(mean): 74 (21 Jan 2019 08:05) (67 - 93)  ABP: --  ABP(mean): --  RR: 33 (21 Jan 2019 08:05) (18 - 33)  SpO2: 95% (21 Jan 2019 08:05) (92% - 96%)      I&O's Summary    20 Jan 2019 07:01  -  21 Jan 2019 07:00  --------------------------------------------------------  IN: 1000 mL / OUT: 2320 mL / NET: -1320 mL      PHYSICAL EXAM:  General: WN/WD NAD    HEENT:     [+] NCAT  [+] EOMI  [-] Conjuctival edema   [-] Icterus   [-] Thrush   [-] ETT  [-] NGT/OGT    Neck:         [+] FROM   [-] JVD     [-] Nodes     [-] Masses    [+] Mid-line trachea    [-] Tracheostomy    Chest:         [-] Sternal click   [-] Sternal drainage   [+] Pacing wires   [-] Chest tubes   [-] SubQ emphysema    Lungs:          [+] CTA   [-] Rhonchi   [-] Rales    [-] Wheezing    [-] Decreased BS    [-] Dullness R L    Cardiac:       [+] S1 [+] S2    [+] RRR   [-] Irregular   [-] S3   [-] S4    [-] Murmurs    [-] Rub    Abdomen:    [+] BS    [+] Soft    [+] Non-tender     [-] Distended    [-] Organomegaly  [-] PEG    Extremities:   [-] Cyanosis U/L   [-] Clubbing  U/L  [-] LE/UE Edema   [+] Capillary refill    [+] Pulses     Neuro:        [+] Awake   [+]  Alert   [-] Confused   [-] Lethargic   [-] Sedated   [-] Generalized Weakness    Skin:        [-] Rashes    [-] Erythema   [+] Normal incisions   [+] IV sites intact   [-] Sacral decubitus    Tubes: none  LINES: periph    CAPILLARY BLOOD GLUCOSE      POCT Blood Glucose.: 171 mg/dL (21 Jan 2019 06:08)    CAPILLARY BLOOD GLUCOSE      POCT Blood Glucose.: 171 mg/dL (21 Jan 2019 06:08)  POCT Blood Glucose.: 118 mg/dL (20 Jan 2019 22:59)  POCT Blood Glucose.: 104 mg/dL (20 Jan 2019 16:13)  POCT Blood Glucose.: 213 mg/dL (20 Jan 2019 11:22)      HOSPITAL MEDICATIONS:  MEDICATIONS  (STANDING):  aspirin enteric coated 325 milliGRAM(s) Oral daily  atorvastatin 40 milliGRAM(s) Oral at bedtime  chlorhexidine 4% Liquid 1 Application(s) Topical <User Schedule>  dextrose 5%. 1000 milliLiter(s) (50 mL/Hr) IV Continuous <Continuous>  dextrose 50% Injectable 50 milliLiter(s) IV Push every 15 minutes  dextrose 50% Injectable 25 milliLiter(s) IV Push every 15 minutes  docusate sodium 100 milliGRAM(s) Oral three times a day  famotidine    Tablet 20 milliGRAM(s) Oral two times a day  fluticasone propionate 50 MICROgram(s)/spray Nasal Spray 1 Spray(s) Both Nostrils two times a day  heparin  Injectable 5000 Unit(s) SubCutaneous every 8 hours  insulin glargine Injectable (LANTUS) 15 Unit(s) SubCutaneous every morning  insulin lispro (HumaLOG) corrective regimen sliding scale   SubCutaneous three times a day before meals  insulin lispro Injectable (HumaLOG) 5 Unit(s) SubCutaneous before breakfast  insulin lispro Injectable (HumaLOG) 5 Unit(s) SubCutaneous before lunch  insulin lispro Injectable (HumaLOG) 5 Unit(s) SubCutaneous before dinner  magnesium sulfate  IVPB 1 Gram(s) IV Intermittent every 12 hours  metoprolol tartrate 25 milliGRAM(s) Oral three times a day  niCARdipine Infusion 5 mG/Hr (25 mL/Hr) IV Continuous <Continuous>  PARoxetine 20 milliGRAM(s) Oral daily    MEDICATIONS  (PRN):  acetaminophen   Tablet .. 650 milliGRAM(s) Oral every 6 hours PRN Temp greater or equal to 38.5C (101.3F), Moderate Pain (4 - 6)  dextrose 40% Gel 15 Gram(s) Oral once PRN Blood Glucose LESS THAN 70 milliGRAM(s)/deciliter  glucagon  Injectable 1 milliGRAM(s) IntraMuscular once PRN Glucose LESS THAN 70 milligrams/deciliter  oxyCODONE    IR 5 milliGRAM(s) Oral every 4 hours PRN Moderate Pain (4 - 6)  oxyCODONE    IR 10 milliGRAM(s) Oral every 4 hours PRN Severe Pain (7 - 10)  simethicone 80 milliGRAM(s) Chew three times a day PRN Indigestion      LABS:                          9.9    5.17  )-----------( 243      ( 20 Jan 2019 23:14 )             29.1     01-20    143  |  103  |  16  ----------------------------<  145<H>  3.9   |  25  |  0.9    Ca    8.7      20 Jan 2019 23:14  Mg     2.0     01-20      PT/INR - ( 21 Jan 2019 01:50 )   PT: 13.90 sec;   INR: 1.21 ratio         PTT - ( 21 Jan 2019 01:50 )  PTT:28.2 sec        RADIOLOGY:  Reviewed and interpreted by me  CXR from 01-21-19 shows [-] congestion, [-] pneumothorax, [-] R/L effusion, [-] cardiomegaly        ECG:  Reviewed and interpreted by me: NSR 68 T wave inversion V4-5-6  QTC: 435    Assessment:  CAD SP CABG  Hyperglycemia    PAST MEDICAL & SURGICAL HISTORY:  Depression  No significant past surgical history      PLAN:  Neuro: Pain control  Pulm: Encourage coughing, deep breathing and use of incentive spirometry. Wean off supplemental oxygen as able. Daily CXR.   Cardio: Monitor telemetry/alarms. Continue cardiac meds  GI: Tolerating diet. Continue stool softeners. Continue GI prophylaxis  Renal: monitor urine output, supplement electrolytes as needed  Vasc: Heparin SC/SCDs for DVT prophylaxis  Heme: Monitor H/H.   ID: Off antibiotics. Stable.  Endocrine: Monitor finger stick blood sugar and control hyperglycemia with insulin  Physical Therapy: OOB/ambulate        Discussed with Cardiothoracic Team at AM rounds.

## 2019-01-21 NOTE — PROGRESS NOTE ADULT - PROVIDER SPECIALTY LIST ADULT
CCU
CCU
CT Surgery
Cardiology
Cardiology
Critical Care

## 2019-01-21 NOTE — CHART NOTE - NSCHARTNOTEFT_GEN_A_CORE
Registered Dietitian Follow-Up     Patient Profile Reviewed                           Yes [x]   No []     Nutrition History Previously Obtained        Yes [x]  No []       Pertinent Subjective Information:  -Pt OOB in chair at time of assessment, wife at bedside. Reports appetite and PO intake significantly improved >75% of meals and snacks. RD recommendations for Ensure Enlive BID remain pending and pt deferring offer of supplements at this time. RD discussed Heart Healthy diet again with pt and pt wife at bedside, provided informational packet. See updated RD recommendations at end of document. Reassess in 7 days.     Pertinent Medical Interventions:  POD#5 CABG with 3 LIMA, gentle diuresis     Diet order: Regular      Anthropometrics:  - Ht. 177.8cm   - Wt. 109.2kg (1/2)  - %wt change--vs admit wt of 97.2kg. current wt likely bedscale error as no edema noted, wt gain that significant in 4 days unlikely. will monitor and continue to use lowest wt to estimated needs.  - BMI  - IBW     Pertinent Lab Data: (1/20/19) RBC 3.47, H/H 9.9/29.1, serum glucose 145      Pertinent Meds: heparin, aspirin, abx, metoprolol, lantus, humalog, paxil, atorvastatin, colace, Mg sulfate IVPB, oxy      Physical Findings:  - Appearance: AAO, no edema noted   - GI function: none reported by pt +BM 1/20.   - Tubes:  - Oral/Mouth cavity: none reported by pt   - Skin: surgical incision/ BS 21     Nutrition recommendations  Wt used: lowest wt this adm: 97kg, IBW: 78kg (continued from RD note 1/17)     Calories: 6136-8344 kcal/day (MSJ x 1-1.2 AF)--obese BMI considered   Protein: 78-94 gm/day (1-1.2 gm/kg IBW)   Fluid: per CTU team    Nutrition Intake: meeting needs with increased PO intake 75% or greater.      Previous nutrition diagnosis: inadequate oral intake            [] Ongoing          [x] Resolved    [x] No active nutrition diagnosis identified at this time    Nutrition Intervention: meals and snacks, nutrition education  Recommend: Adjust diet order to DASH/TLC as pt s/p CABG      Goal/Expected Outcome: Pt to continue to consume 75% or greater of meals and snacks throughout LOS. Reassess in 7 days.      Indicator/Monitoring:  RD to monitor diet order, energy intake, body composition, nutrition focused physical findings.

## 2019-01-22 PROBLEM — Z00.00 ENCOUNTER FOR PREVENTIVE HEALTH EXAMINATION: Status: ACTIVE | Noted: 2019-01-22

## 2019-01-22 PROBLEM — F32.9 MAJOR DEPRESSIVE DISORDER, SINGLE EPISODE, UNSPECIFIED: Chronic | Status: ACTIVE | Noted: 2019-01-12

## 2019-01-28 DIAGNOSIS — R73.9 HYPERGLYCEMIA, UNSPECIFIED: ICD-10-CM

## 2019-01-28 DIAGNOSIS — E83.42 HYPOMAGNESEMIA: ICD-10-CM

## 2019-01-28 DIAGNOSIS — E78.5 HYPERLIPIDEMIA, UNSPECIFIED: ICD-10-CM

## 2019-01-28 DIAGNOSIS — J90 PLEURAL EFFUSION, NOT ELSEWHERE CLASSIFIED: ICD-10-CM

## 2019-01-28 DIAGNOSIS — F32.9 MAJOR DEPRESSIVE DISORDER, SINGLE EPISODE, UNSPECIFIED: ICD-10-CM

## 2019-01-28 DIAGNOSIS — I10 ESSENTIAL (PRIMARY) HYPERTENSION: ICD-10-CM

## 2019-01-28 DIAGNOSIS — E86.1 HYPOVOLEMIA: ICD-10-CM

## 2019-01-28 DIAGNOSIS — I21.4 NON-ST ELEVATION (NSTEMI) MYOCARDIAL INFARCTION: ICD-10-CM

## 2019-01-28 DIAGNOSIS — R14.0 ABDOMINAL DISTENSION (GASEOUS): ICD-10-CM

## 2019-01-28 DIAGNOSIS — I25.110 ATHEROSCLEROTIC HEART DISEASE OF NATIVE CORONARY ARTERY WITH UNSTABLE ANGINA PECTORIS: ICD-10-CM

## 2019-01-28 DIAGNOSIS — D62 ACUTE POSTHEMORRHAGIC ANEMIA: ICD-10-CM

## 2019-01-28 DIAGNOSIS — E87.6 HYPOKALEMIA: ICD-10-CM

## 2019-01-28 DIAGNOSIS — F41.9 ANXIETY DISORDER, UNSPECIFIED: ICD-10-CM

## 2019-01-28 DIAGNOSIS — R07.9 CHEST PAIN, UNSPECIFIED: ICD-10-CM

## 2019-01-29 ENCOUNTER — APPOINTMENT (OUTPATIENT)
Dept: CARDIOTHORACIC SURGERY | Facility: CLINIC | Age: 69
End: 2019-01-29

## 2019-01-29 VITALS
HEART RATE: 72 BPM | BODY MASS INDEX: 29.63 KG/M2 | TEMPERATURE: 98.1 F | RESPIRATION RATE: 13 BRPM | DIASTOLIC BLOOD PRESSURE: 67 MMHG | HEIGHT: 70 IN | WEIGHT: 207 LBS | OXYGEN SATURATION: 95 % | SYSTOLIC BLOOD PRESSURE: 109 MMHG

## 2019-01-29 RX ORDER — OXYCODONE HYDROCHLORIDE AND ACETAMINOPHEN 5; 325 MG/1; MG/1
5-325 TABLET ORAL
Refills: 0 | Status: DISCONTINUED | COMMUNITY
End: 2019-01-29

## 2019-02-12 ENCOUNTER — APPOINTMENT (OUTPATIENT)
Dept: CARDIOTHORACIC SURGERY | Facility: CLINIC | Age: 69
End: 2019-02-12

## 2019-02-12 VITALS
HEART RATE: 64 BPM | SYSTOLIC BLOOD PRESSURE: 124 MMHG | TEMPERATURE: 97.4 F | OXYGEN SATURATION: 95 % | BODY MASS INDEX: 29.41 KG/M2 | DIASTOLIC BLOOD PRESSURE: 72 MMHG | RESPIRATION RATE: 13 BRPM | WEIGHT: 205 LBS

## 2019-02-12 RX ORDER — FLUTICASONE PROPIONATE 50 MCG
50 SPRAY, SUSPENSION NASAL
Refills: 0 | Status: DISCONTINUED | COMMUNITY
End: 2019-02-12

## 2019-02-12 RX ORDER — FAMOTIDINE 20 MG/1
20 TABLET, FILM COATED ORAL
Refills: 0 | Status: DISCONTINUED | COMMUNITY
End: 2019-02-12

## 2019-02-12 RX ORDER — FUROSEMIDE 40 MG/1
40 TABLET ORAL
Refills: 0 | Status: DISCONTINUED | COMMUNITY
End: 2019-02-12

## 2019-02-12 RX ORDER — METOPROLOL TARTRATE 25 MG/1
25 TABLET, FILM COATED ORAL
Refills: 0 | Status: DISCONTINUED | COMMUNITY
End: 2019-02-12

## 2019-02-12 RX ORDER — DOCUSATE SODIUM 100 MG/1
100 CAPSULE, LIQUID FILLED ORAL
Refills: 0 | Status: DISCONTINUED | COMMUNITY
End: 2019-02-12

## 2019-02-12 RX ORDER — POTASSIUM CHLORIDE 1500 MG/1
20 TABLET, FILM COATED, EXTENDED RELEASE ORAL
Refills: 0 | Status: DISCONTINUED | COMMUNITY
End: 2019-02-12

## 2020-03-06 ENCOUNTER — APPOINTMENT (OUTPATIENT)
Dept: CARDIOLOGY | Facility: CLINIC | Age: 70
End: 2020-03-06
Payer: MEDICARE

## 2020-03-06 VITALS
BODY MASS INDEX: 29.2 KG/M2 | SYSTOLIC BLOOD PRESSURE: 110 MMHG | HEART RATE: 56 BPM | DIASTOLIC BLOOD PRESSURE: 76 MMHG | HEIGHT: 70 IN | WEIGHT: 204 LBS

## 2020-03-06 PROCEDURE — 99204 OFFICE O/P NEW MOD 45 MIN: CPT

## 2020-03-06 PROCEDURE — 93000 ELECTROCARDIOGRAM COMPLETE: CPT

## 2020-03-06 RX ORDER — ATORVASTATIN CALCIUM 40 MG/1
40 TABLET, FILM COATED ORAL DAILY
Refills: 0 | Status: ACTIVE | COMMUNITY

## 2020-03-06 RX ORDER — METOPROLOL SUCCINATE 50 MG/1
50 TABLET, EXTENDED RELEASE ORAL
Refills: 0 | Status: ACTIVE | COMMUNITY

## 2020-03-06 RX ORDER — ASPIRIN 81 MG
81 TABLET, DELAYED RELEASE (ENTERIC COATED) ORAL DAILY
Refills: 0 | Status: ACTIVE | COMMUNITY

## 2020-03-06 RX ORDER — LISINOPRIL 2.5 MG/1
2.5 TABLET ORAL
Refills: 0 | Status: ACTIVE | COMMUNITY

## 2020-03-06 RX ORDER — PAROXETINE HYDROCHLORIDE 20 MG/1
20 TABLET, FILM COATED ORAL DAILY
Refills: 0 | Status: ACTIVE | COMMUNITY

## 2020-03-06 NOTE — HISTORY OF PRESENT ILLNESS
[FreeTextEntry1] : The patient had been feeling well until this past January when he developed CP . He was admitted to [facility] then transferred to [place] after being found to have a NSTEMI . He had 3 vessel CAD . He subsequently had 3 vessel CABG LIMA to LAD and SVG to LCX and SVG to PLV .  Since surgery he has has some SOB but no chest pain .

## 2020-03-06 NOTE — PHYSICAL EXAM
[General Appearance - Well Developed] : well developed [Normal Appearance] : normal appearance [Well Groomed] : well groomed [General Appearance - Well Nourished] : well nourished [No Deformities] : no deformities [General Appearance - In No Acute Distress] : no acute distress [Normal Conjunctiva] : the conjunctiva exhibited no abnormalities [Eyelids - No Xanthelasma] : the eyelids demonstrated no xanthelasmas [Normal Oral Mucosa] : normal oral mucosa [No Oral Pallor] : no oral pallor [No Oral Cyanosis] : no oral cyanosis [Normal Jugular Venous A Waves Present] : normal jugular venous A waves present [Normal Jugular Venous V Waves Present] : normal jugular venous V waves present [No Jugular Venous Dupree A Waves] : no jugular venous dupree A waves [Normal Rate] : normal [Rhythm Regular] : regular [No Murmur] : no murmurs heard [2+] : left 2+ [No Pitting Edema] : no pitting edema present [Respiration, Rhythm And Depth] : normal respiratory rhythm and effort [Exaggerated Use Of Accessory Muscles For Inspiration] : no accessory muscle use [Auscultation Breath Sounds / Voice Sounds] : lungs were clear to auscultation bilaterally [Abdomen Soft] : soft [Abdomen Tenderness] : non-tender [Abdomen Mass (___ Cm)] : no abdominal mass palpated [Nail Clubbing] : no clubbing of the fingernails [Cyanosis, Localized] : no localized cyanosis [Petechial Hemorrhages (___cm)] : no petechial hemorrhages [Skin Color & Pigmentation] : normal skin color and pigmentation [] : no rash [No Venous Stasis] : no venous stasis [Skin Lesions] : no skin lesions [No Skin Ulcers] : no skin ulcer [No Xanthoma] : no  xanthoma was observed

## 2020-03-06 NOTE — REASON FOR VISIT
[Consultation] : a consultation regarding [Coronary Artery Disease] : coronary artery disease [CABG Follow-up] : bypass graft [Hyperlipidemia] : hyperlipidemia [Hypertension] : hypertension

## 2020-03-06 NOTE — ASSESSMENT
[FreeTextEntry1] : the patient has had 3 vessel CABG after NSTEMI , at this time has been uncomplicated . His BP is stable

## 2020-07-14 ENCOUNTER — APPOINTMENT (OUTPATIENT)
Dept: CARDIOLOGY | Facility: CLINIC | Age: 70
End: 2020-07-14
Payer: MEDICARE

## 2020-07-14 VITALS
SYSTOLIC BLOOD PRESSURE: 120 MMHG | HEART RATE: 62 BPM | TEMPERATURE: 95 F | HEIGHT: 70 IN | DIASTOLIC BLOOD PRESSURE: 84 MMHG | BODY MASS INDEX: 29.49 KG/M2 | WEIGHT: 206 LBS

## 2020-07-14 DIAGNOSIS — E78.5 HYPERLIPIDEMIA, UNSPECIFIED: ICD-10-CM

## 2020-07-14 DIAGNOSIS — I10 ESSENTIAL (PRIMARY) HYPERTENSION: ICD-10-CM

## 2020-07-14 DIAGNOSIS — Z95.1 PRESENCE OF AORTOCORONARY BYPASS GRAFT: ICD-10-CM

## 2020-07-14 PROCEDURE — 99214 OFFICE O/P EST MOD 30 MIN: CPT

## 2020-07-14 PROCEDURE — 93000 ELECTROCARDIOGRAM COMPLETE: CPT

## 2020-07-14 RX ORDER — METOPROLOL SUCCINATE 25 MG/1
25 TABLET, EXTENDED RELEASE ORAL
Refills: 0 | Status: DISCONTINUED | COMMUNITY
End: 2020-07-14

## 2020-07-14 NOTE — PHYSICAL EXAM
[General Appearance - Well Developed] : well developed [Normal Appearance] : normal appearance [Well Groomed] : well groomed [General Appearance - Well Nourished] : well nourished [No Deformities] : no deformities [General Appearance - In No Acute Distress] : no acute distress [Normal Conjunctiva] : the conjunctiva exhibited no abnormalities [Eyelids - No Xanthelasma] : the eyelids demonstrated no xanthelasmas [Normal Oral Mucosa] : normal oral mucosa [No Oral Pallor] : no oral pallor [No Oral Cyanosis] : no oral cyanosis [Normal Jugular Venous A Waves Present] : normal jugular venous A waves present [Normal Jugular Venous V Waves Present] : normal jugular venous V waves present [No Jugular Venous Dupree A Waves] : no jugular venous dupree A waves [Respiration, Rhythm And Depth] : normal respiratory rhythm and effort [Exaggerated Use Of Accessory Muscles For Inspiration] : no accessory muscle use [Abdomen Soft] : soft [Auscultation Breath Sounds / Voice Sounds] : lungs were clear to auscultation bilaterally [Abdomen Tenderness] : non-tender [Abdomen Mass (___ Cm)] : no abdominal mass palpated [Cyanosis, Localized] : no localized cyanosis [Nail Clubbing] : no clubbing of the fingernails [Petechial Hemorrhages (___cm)] : no petechial hemorrhages [Skin Color & Pigmentation] : normal skin color and pigmentation [] : no rash [No Venous Stasis] : no venous stasis [Skin Lesions] : no skin lesions [No Skin Ulcers] : no skin ulcer [No Xanthoma] : no  xanthoma was observed [Normal Rate] : normal [No Murmur] : no murmurs heard [Rhythm Regular] : regular [2+] : right 2+ [No Pitting Edema] : no pitting edema present

## 2020-07-14 NOTE — HISTORY OF PRESENT ILLNESS
[FreeTextEntry1] : The patient has been feeling well. No chest pain or SOB . He is not certain if his PCP had stopped one of his medications.

## 2020-07-14 NOTE — ASSESSMENT
[FreeTextEntry1] : the patient has had 3 vessel CABG after NSTEMI , at this time has been uncomplicated . His BP is stable ( 110/80 on repeat BP ) . The patient has not had blood work . Discussed improtance of risk factor reduction

## 2020-10-09 NOTE — DISCHARGE NOTE ADULT - BECAUSE OF A PHYSICAL, MENTAL OR EMOTIONAL CONDITION, DO YOU HAVE DIFFICULTY DOING  ERRANDS ALONE LIKE VISITING A DOCTOR'S OFFICE OR SHOPPING (15 YEARS AND OLDER)
Problem:  Activity:  Goal: Capacity to carry out activities will improve  Description: Capacity to carry out activities will improve  Outcome: Met This Shift  Goal: Will verbalize the importance of balancing activity with adequate rest periods  Description: Will verbalize the importance of balancing activity with adequate rest periods  Outcome: Met This Shift     Problem: Cardiac:  Goal: Hemodynamic stability will improve  Description: Hemodynamic stability will improve  Outcome: Met This Shift  Goal: Ability to maintain an adequate cardiac output will improve  Description: Ability to maintain an adequate cardiac output will improve  Outcome: Met This Shift Yes

## 2021-06-18 ENCOUNTER — APPOINTMENT (OUTPATIENT)
Dept: CARDIOLOGY | Facility: CLINIC | Age: 71
End: 2021-06-18

## 2021-10-20 ENCOUNTER — EMERGENCY (EMERGENCY)
Facility: HOSPITAL | Age: 71
LOS: 0 days | Discharge: HOME | End: 2021-10-20
Attending: EMERGENCY MEDICINE | Admitting: EMERGENCY MEDICINE
Payer: MEDICARE

## 2021-10-20 VITALS
TEMPERATURE: 98 F | DIASTOLIC BLOOD PRESSURE: 70 MMHG | OXYGEN SATURATION: 98 % | WEIGHT: 194.01 LBS | HEART RATE: 71 BPM | HEIGHT: 70 IN | RESPIRATION RATE: 18 BRPM | SYSTOLIC BLOOD PRESSURE: 106 MMHG

## 2021-10-20 VITALS
RESPIRATION RATE: 16 BRPM | DIASTOLIC BLOOD PRESSURE: 80 MMHG | SYSTOLIC BLOOD PRESSURE: 113 MMHG | HEART RATE: 61 BPM | OXYGEN SATURATION: 97 %

## 2021-10-20 DIAGNOSIS — E78.5 HYPERLIPIDEMIA, UNSPECIFIED: ICD-10-CM

## 2021-10-20 DIAGNOSIS — I25.10 ATHEROSCLEROTIC HEART DISEASE OF NATIVE CORONARY ARTERY WITHOUT ANGINA PECTORIS: ICD-10-CM

## 2021-10-20 DIAGNOSIS — F17.200 NICOTINE DEPENDENCE, UNSPECIFIED, UNCOMPLICATED: ICD-10-CM

## 2021-10-20 DIAGNOSIS — F32.A DEPRESSION, UNSPECIFIED: ICD-10-CM

## 2021-10-20 DIAGNOSIS — S61.011A LACERATION WITHOUT FOREIGN BODY OF RIGHT THUMB WITHOUT DAMAGE TO NAIL, INITIAL ENCOUNTER: ICD-10-CM

## 2021-10-20 DIAGNOSIS — I10 ESSENTIAL (PRIMARY) HYPERTENSION: ICD-10-CM
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
## 2021-10-20 DIAGNOSIS — Z91.048 OTHER NONMEDICINAL SUBSTANCE ALLERGY STATUS: ICD-10-CM

## 2021-10-20 DIAGNOSIS — E78.00 PURE HYPERCHOLESTEROLEMIA, UNSPECIFIED: ICD-10-CM

## 2021-10-20 DIAGNOSIS — Z79.82 LONG TERM (CURRENT) USE OF ASPIRIN: ICD-10-CM
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
## 2021-10-20 DIAGNOSIS — Z95.1 PRESENCE OF AORTOCORONARY BYPASS GRAFT: Chronic | ICD-10-CM

## 2021-10-20 DIAGNOSIS — R55 SYNCOPE AND COLLAPSE: ICD-10-CM

## 2021-10-20 DIAGNOSIS — Z95.1 PRESENCE OF AORTOCORONARY BYPASS GRAFT: ICD-10-CM

## 2021-10-20 DIAGNOSIS — E11.9 TYPE 2 DIABETES MELLITUS WITHOUT COMPLICATIONS: ICD-10-CM

## 2021-10-20 LAB
ALBUMIN SERPL ELPH-MCNC: 4.1 G/DL — SIGNIFICANT CHANGE UP (ref 3.5–5.2)
ALP SERPL-CCNC: 80 U/L — SIGNIFICANT CHANGE UP (ref 30–115)
ALT FLD-CCNC: 29 U/L — SIGNIFICANT CHANGE UP (ref 0–41)
ANION GAP SERPL CALC-SCNC: 11 MMOL/L — SIGNIFICANT CHANGE UP (ref 7–14)
AST SERPL-CCNC: 21 U/L — SIGNIFICANT CHANGE UP (ref 0–41)
BASOPHILS # BLD AUTO: 0.07 K/UL — SIGNIFICANT CHANGE UP (ref 0–0.2)
BASOPHILS NFR BLD AUTO: 1.4 % — HIGH (ref 0–1)
BILIRUB SERPL-MCNC: 1.2 MG/DL — SIGNIFICANT CHANGE UP (ref 0.2–1.2)
BUN SERPL-MCNC: 15 MG/DL — SIGNIFICANT CHANGE UP (ref 10–20)
CALCIUM SERPL-MCNC: 9.4 MG/DL — SIGNIFICANT CHANGE UP (ref 8.5–10.1)
CHLORIDE SERPL-SCNC: 107 MMOL/L — SIGNIFICANT CHANGE UP (ref 98–110)
CO2 SERPL-SCNC: 21 MMOL/L — SIGNIFICANT CHANGE UP (ref 17–32)
CREAT SERPL-MCNC: 0.8 MG/DL — SIGNIFICANT CHANGE UP (ref 0.7–1.5)
EOSINOPHIL # BLD AUTO: 0.15 K/UL — SIGNIFICANT CHANGE UP (ref 0–0.7)
EOSINOPHIL NFR BLD AUTO: 3 % — SIGNIFICANT CHANGE UP (ref 0–8)
GLUCOSE SERPL-MCNC: 159 MG/DL — HIGH (ref 70–99)
HCT VFR BLD CALC: 42.7 % — SIGNIFICANT CHANGE UP (ref 42–52)
HGB BLD-MCNC: 14.6 G/DL — SIGNIFICANT CHANGE UP (ref 14–18)
IMM GRANULOCYTES NFR BLD AUTO: 0.4 % — HIGH (ref 0.1–0.3)
LYMPHOCYTES # BLD AUTO: 1.49 K/UL — SIGNIFICANT CHANGE UP (ref 1.2–3.4)
LYMPHOCYTES # BLD AUTO: 29.6 % — SIGNIFICANT CHANGE UP (ref 20.5–51.1)
MAGNESIUM SERPL-MCNC: 1.9 MG/DL — SIGNIFICANT CHANGE UP (ref 1.8–2.4)
MCHC RBC-ENTMCNC: 29.3 PG — SIGNIFICANT CHANGE UP (ref 27–31)
MCHC RBC-ENTMCNC: 34.2 G/DL — SIGNIFICANT CHANGE UP (ref 32–37)
MCV RBC AUTO: 85.7 FL — SIGNIFICANT CHANGE UP (ref 80–94)
MONOCYTES # BLD AUTO: 0.33 K/UL — SIGNIFICANT CHANGE UP (ref 0.1–0.6)
MONOCYTES NFR BLD AUTO: 6.6 % — SIGNIFICANT CHANGE UP (ref 1.7–9.3)
NEUTROPHILS # BLD AUTO: 2.97 K/UL — SIGNIFICANT CHANGE UP (ref 1.4–6.5)
NEUTROPHILS NFR BLD AUTO: 59 % — SIGNIFICANT CHANGE UP (ref 42.2–75.2)
NRBC # BLD: 0 /100 WBCS — SIGNIFICANT CHANGE UP (ref 0–0)
NT-PROBNP SERPL-SCNC: 148 PG/ML — SIGNIFICANT CHANGE UP (ref 0–300)
PLATELET # BLD AUTO: 183 K/UL — SIGNIFICANT CHANGE UP (ref 130–400)
POTASSIUM SERPL-MCNC: 4.2 MMOL/L — SIGNIFICANT CHANGE UP (ref 3.5–5)
POTASSIUM SERPL-SCNC: 4.2 MMOL/L — SIGNIFICANT CHANGE UP (ref 3.5–5)
PROT SERPL-MCNC: 6.6 G/DL — SIGNIFICANT CHANGE UP (ref 6–8)
RBC # BLD: 4.98 M/UL — SIGNIFICANT CHANGE UP (ref 4.7–6.1)
RBC # FLD: 12.5 % — SIGNIFICANT CHANGE UP (ref 11.5–14.5)
SODIUM SERPL-SCNC: 139 MMOL/L — SIGNIFICANT CHANGE UP (ref 135–146)
TROPONIN T SERPL-MCNC: <0.01 NG/ML — SIGNIFICANT CHANGE UP
WBC # BLD: 5.03 K/UL — SIGNIFICANT CHANGE UP (ref 4.8–10.8)
WBC # FLD AUTO: 5.03 K/UL — SIGNIFICANT CHANGE UP (ref 4.8–10.8)

## 2021-10-20 PROCEDURE — 99285 EMERGENCY DEPT VISIT HI MDM: CPT

## 2021-10-20 PROCEDURE — 73130 X-RAY EXAM OF HAND: CPT | Mod: 26,RT

## 2021-10-20 PROCEDURE — 93010 ELECTROCARDIOGRAM REPORT: CPT

## 2021-10-20 PROCEDURE — 71045 X-RAY EXAM CHEST 1 VIEW: CPT | Mod: 26

## 2021-10-20 RX ORDER — CEPHALEXIN 500 MG
1 CAPSULE ORAL
Qty: 28 | Refills: 0
Start: 2021-10-20 | End: 2021-10-26

## 2021-10-20 NOTE — ED PROVIDER NOTE - NS ED ROS FT
Cardiac:  No chest pain, SOB or edema.  Respiratory:  No cough or respiratory distress. No hemoptysis. No history of asthma or RAD.  GI:  No nausea, vomiting, diarrhea or abdominal pain.  MS:  No myalgia, muscle weakness, joint pain or back pain.  Neuro:  No headache or weakness.  No LOC.  Skin:  + lac  Endocrine: + diabetes.  Except as documented in the HPI,  all other systems are negative.

## 2021-10-20 NOTE — ED PROVIDER NOTE - PHYSICAL EXAMINATION
VITAL SIGNS: I have reviewed nursing notes and confirm.  CONSTITUTIONAL: Well-developed; well-nourished; in no acute distress.   SKIN: lac to finger pad of right thumb   HEAD: Normocephalic; atraumatic.  EYES: conjunctiva and sclera clear.  ENT: No nasal discharge; airway clear.  EXT: full ROM of affected digit, sensation intact Normal ROM.  No clubbing, cyanosis or edema.   NEURO: Alert, oriented, grossly unremarkable

## 2021-10-20 NOTE — ED PROVIDER NOTE - ATTENDING CONTRIBUTION TO CARE
I personally evaluated the patient. I reviewed the Resident’s or Physician Assistant’s note (as assigned above), and agree with the findings and plan except as documented in my note.  72 yo man with laceration to thumb on right hand on Sunday.  was seen at [facility] and told to come to ED for repair.  Patient decided not to come in hoping it would improve.  arrived today with delayed healing laceration.  XRAY showed ? superficial FB, but nothing seen on exam with forceps under anesthesia.  During eval, patient had vasovagal episode.  Cardiac workup was negative.  Stable for discharge and outpatient hand and cardio follow up.

## 2021-10-20 NOTE — ED PROVIDER NOTE - CLINICAL SUMMARY MEDICAL DECISION MAKING FREE TEXT BOX
70 yo with delayed laceration from circular saw on sunday.  too late to repair at this time.  ? FB.  not seen on exam.  Vasovagal episode in ED.  cardiac workup ok.  Stable for outpatient hand and cardio follow up.

## 2021-10-20 NOTE — ED PROVIDER NOTE - NSFOLLOWUPINSTRUCTIONS_ED_ALL_ED_FT
Laceration    A laceration is a cut that goes through all of the layers of the skin and into the tissue that is right under the skin. Some lacerations heal on their own. Others need to be closed with skin adhesive strips, skin glue, stitches (sutures), or staples. Proper laceration care minimizes the risk of infection and helps the laceration to heal better.  If non-absorbable stitches or staples have been placed, they must be taken out within the time frame instructed by your healthcare provider.  Yours could not be sutured due to the fact that it has been over 48 hours.      SEEK IMMEDIATE MEDICAL CARE IF YOU HAVE ANY OF THE FOLLOWING SYMPTOMS: swelling around the wound, worsening pain, drainage from the wound, red streaking going away from your wound, inability to move finger or toe near the laceration, or discoloration of skin near the laceration.    Syncope    Syncope is when you temporarily lose consciousness, also called fainting or passing out. It is caused by a sudden decrease in blood flow to the brain. Even though most causes of syncope are not dangerous, syncope can possibly be a sign of a serious medical problem. Signs that you may be about to faint include feeling dizzy, lightheaded, nausea, visual changes, or cold/clammy skin. Do not drive, operate heavy machinery, or play sports until your health care provider says it is okay.    SEEK IMMEDIATE MEDICAL CARE IF YOU HAVE ANY OF THE FOLLOWING SYMPTOMS: severe headache, pain in your chest/abdomen/back, bleeding from your mouth or rectum, palpitations, shortness of breath, pain with breathing, seizure, confusion, or trouble walking.

## 2021-10-20 NOTE — ED ADULT NURSE REASSESSMENT NOTE - NS ED NURSE REASSESS COMMENT FT1
Pt vasovagalled after wound cleaning. Pt placed in supine position. Pt advised he has not had anything to eat all day. Pt was provided with lunch box. Pt reassessed and feeling "much better". Vital signs taken and WNL. Pt discharged home in company of family and will follow up as instructed.

## 2021-10-20 NOTE — ED PROVIDER NOTE - OBJECTIVE STATEMENT
Pt is a 72y/o male with a pmhx of HTN, HLD, CAD/CABG, DM presents today for eval of laceration to right thum sustained from saw 2 days ago. Pt was originally evaluated by urgent care at the time of the incident but was instructed to have lac repaired in ED but never went. Pt denies fever, chills, weakness, numbness, drainage, redness

## 2021-10-20 NOTE — ED PROVIDER NOTE - PATIENT PORTAL LINK FT
You can access the FollowMyHealth Patient Portal offered by [facility] by registering at the following website: [email or web address]. By joining [facility]’s FollowMyHealth portal, you will also be able to view your health information using other applications (apps) compatible with our system.

## 2021-10-20 NOTE — ED PROVIDER NOTE - CARE PROVIDER_API CALL
Negro Corral)  Cardiovascular Disease; Internal Medicine  [address] [place] [address]  Phone: [phone]  Fax: [phone]  Follow Up Time:     Ayo Clancy)  Orthopaedic Surgery  [address] [place] [address]  Phone: [phone]  Fax: [phone]  Follow Up Time:

## 2021-10-20 NOTE — ED PROVIDER NOTE - CARE PROVIDERS DIRECT ADDRESSES
[email or web address]

## 2021-10-20 NOTE — ED PROVIDER NOTE - CARE PLAN
Principal Discharge DX:	Laceration of thumb without damage to nail, initial encounter  Secondary Diagnosis:	Skin foreign body  Secondary Diagnosis:	Vasovagal syncope   1

## 2021-10-20 NOTE — ED PROVIDER NOTE - PROGRESS NOTE DETAILS
pt made aware of foreign body, wound cleaned extensively, will dc on abx with hand f/u, pt agreeable

## 2022-03-16 PROBLEM — I10 ESSENTIAL (PRIMARY) HYPERTENSION: Chronic | Status: ACTIVE | Noted: 2021-10-20

## 2022-03-16 PROBLEM — E78.00 PURE HYPERCHOLESTEROLEMIA, UNSPECIFIED: Chronic | Status: ACTIVE | Noted: 2021-10-20

## 2022-04-06 ENCOUNTER — APPOINTMENT (OUTPATIENT)
Dept: VASCULAR SURGERY | Facility: CLINIC | Age: 72
End: 2022-04-06
Payer: MEDICARE

## 2022-04-06 VITALS
HEIGHT: 70 IN | SYSTOLIC BLOOD PRESSURE: 101 MMHG | BODY MASS INDEX: 26.34 KG/M2 | DIASTOLIC BLOOD PRESSURE: 69 MMHG | WEIGHT: 184 LBS

## 2022-04-06 DIAGNOSIS — I70.213 ATHEROSCLEROSIS OF NATIVE ARTERIES OF EXTREMITIES WITH INTERMITTENT CLAUDICATION, BILATERAL LEGS: ICD-10-CM

## 2022-04-06 DIAGNOSIS — R73.03 PREDIABETES.: ICD-10-CM

## 2022-04-06 PROCEDURE — 99204 OFFICE O/P NEW MOD 45 MIN: CPT

## 2022-04-06 PROCEDURE — 93925 LOWER EXTREMITY STUDY: CPT

## 2022-09-14 ENCOUNTER — EMERGENCY (EMERGENCY)
Facility: HOSPITAL | Age: 72
LOS: 0 days | Discharge: HOME | End: 2022-09-14
Attending: EMERGENCY MEDICINE | Admitting: EMERGENCY MEDICINE

## 2022-09-14 VITALS
TEMPERATURE: 97 F | WEIGHT: 179.9 LBS | HEIGHT: 70 IN | HEART RATE: 63 BPM | DIASTOLIC BLOOD PRESSURE: 71 MMHG | OXYGEN SATURATION: 98 % | RESPIRATION RATE: 20 BRPM | SYSTOLIC BLOOD PRESSURE: 126 MMHG

## 2022-09-14 DIAGNOSIS — Z95.1 PRESENCE OF AORTOCORONARY BYPASS GRAFT: Chronic | ICD-10-CM

## 2022-09-14 DIAGNOSIS — Z79.82 LONG TERM (CURRENT) USE OF ASPIRIN: ICD-10-CM

## 2022-09-14 DIAGNOSIS — M79.89 OTHER SPECIFIED SOFT TISSUE DISORDERS: ICD-10-CM

## 2022-09-14 DIAGNOSIS — I10 ESSENTIAL (PRIMARY) HYPERTENSION: ICD-10-CM

## 2022-09-14 DIAGNOSIS — Z91.048 OTHER NONMEDICINAL SUBSTANCE ALLERGY STATUS: ICD-10-CM

## 2022-09-14 DIAGNOSIS — F32.9 MAJOR DEPRESSIVE DISORDER, SINGLE EPISODE, UNSPECIFIED: ICD-10-CM

## 2022-09-14 DIAGNOSIS — E78.00 PURE HYPERCHOLESTEROLEMIA, UNSPECIFIED: ICD-10-CM

## 2022-09-14 DIAGNOSIS — Z95.1 PRESENCE OF AORTOCORONARY BYPASS GRAFT: ICD-10-CM

## 2022-09-14 DIAGNOSIS — I25.10 ATHEROSCLEROTIC HEART DISEASE OF NATIVE CORONARY ARTERY WITHOUT ANGINA PECTORIS: ICD-10-CM

## 2022-09-14 LAB
ALBUMIN SERPL ELPH-MCNC: 4.2 G/DL — SIGNIFICANT CHANGE UP (ref 3.5–5.2)
ALP SERPL-CCNC: 85 U/L — SIGNIFICANT CHANGE UP (ref 30–115)
ALT FLD-CCNC: 24 U/L — SIGNIFICANT CHANGE UP (ref 0–41)
ANION GAP SERPL CALC-SCNC: 8 MMOL/L — SIGNIFICANT CHANGE UP (ref 7–14)
AST SERPL-CCNC: 16 U/L — SIGNIFICANT CHANGE UP (ref 0–41)
BASOPHILS # BLD AUTO: 0.04 K/UL — SIGNIFICANT CHANGE UP (ref 0–0.2)
BASOPHILS NFR BLD AUTO: 1 % — SIGNIFICANT CHANGE UP (ref 0–1)
BILIRUB SERPL-MCNC: 1 MG/DL — SIGNIFICANT CHANGE UP (ref 0.2–1.2)
BUN SERPL-MCNC: 20 MG/DL — SIGNIFICANT CHANGE UP (ref 10–20)
CALCIUM SERPL-MCNC: 9.4 MG/DL — SIGNIFICANT CHANGE UP (ref 8.4–10.5)
CHLORIDE SERPL-SCNC: 105 MMOL/L — SIGNIFICANT CHANGE UP (ref 98–110)
CO2 SERPL-SCNC: 27 MMOL/L — SIGNIFICANT CHANGE UP (ref 17–32)
CREAT SERPL-MCNC: 0.8 MG/DL — SIGNIFICANT CHANGE UP (ref 0.7–1.5)
EGFR: 95 ML/MIN/1.73M2 — SIGNIFICANT CHANGE UP
EOSINOPHIL # BLD AUTO: 0.13 K/UL — SIGNIFICANT CHANGE UP (ref 0–0.7)
EOSINOPHIL NFR BLD AUTO: 3.3 % — SIGNIFICANT CHANGE UP (ref 0–8)
GLUCOSE SERPL-MCNC: 109 MG/DL — HIGH (ref 70–99)
HCT VFR BLD CALC: 36.3 % — LOW (ref 42–52)
HGB BLD-MCNC: 12.2 G/DL — LOW (ref 14–18)
IMM GRANULOCYTES NFR BLD AUTO: 0.3 % — SIGNIFICANT CHANGE UP (ref 0.1–0.3)
LYMPHOCYTES # BLD AUTO: 1.26 K/UL — SIGNIFICANT CHANGE UP (ref 1.2–3.4)
LYMPHOCYTES # BLD AUTO: 31.7 % — SIGNIFICANT CHANGE UP (ref 20.5–51.1)
MCHC RBC-ENTMCNC: 29 PG — SIGNIFICANT CHANGE UP (ref 27–31)
MCHC RBC-ENTMCNC: 33.6 G/DL — SIGNIFICANT CHANGE UP (ref 32–37)
MCV RBC AUTO: 86.2 FL — SIGNIFICANT CHANGE UP (ref 80–94)
MONOCYTES # BLD AUTO: 0.28 K/UL — SIGNIFICANT CHANGE UP (ref 0.1–0.6)
MONOCYTES NFR BLD AUTO: 7 % — SIGNIFICANT CHANGE UP (ref 1.7–9.3)
NEUTROPHILS # BLD AUTO: 2.26 K/UL — SIGNIFICANT CHANGE UP (ref 1.4–6.5)
NEUTROPHILS NFR BLD AUTO: 56.7 % — SIGNIFICANT CHANGE UP (ref 42.2–75.2)
NRBC # BLD: 0 /100 WBCS — SIGNIFICANT CHANGE UP (ref 0–0)
NT-PROBNP SERPL-SCNC: 265 PG/ML — SIGNIFICANT CHANGE UP (ref 0–300)
PLATELET # BLD AUTO: 155 K/UL — SIGNIFICANT CHANGE UP (ref 130–400)
POTASSIUM SERPL-MCNC: 3.9 MMOL/L — SIGNIFICANT CHANGE UP (ref 3.5–5)
POTASSIUM SERPL-SCNC: 3.9 MMOL/L — SIGNIFICANT CHANGE UP (ref 3.5–5)
PROT SERPL-MCNC: 6.2 G/DL — SIGNIFICANT CHANGE UP (ref 6–8)
RBC # BLD: 4.21 M/UL — LOW (ref 4.7–6.1)
RBC # FLD: 12.7 % — SIGNIFICANT CHANGE UP (ref 11.5–14.5)
SODIUM SERPL-SCNC: 140 MMOL/L — SIGNIFICANT CHANGE UP (ref 135–146)
TROPONIN T SERPL-MCNC: <0.01 NG/ML — SIGNIFICANT CHANGE UP
WBC # BLD: 3.98 K/UL — LOW (ref 4.8–10.8)
WBC # FLD AUTO: 3.98 K/UL — LOW (ref 4.8–10.8)

## 2022-09-14 PROCEDURE — 93970 EXTREMITY STUDY: CPT | Mod: 26

## 2022-09-14 PROCEDURE — 93010 ELECTROCARDIOGRAM REPORT: CPT

## 2022-09-14 PROCEDURE — 99285 EMERGENCY DEPT VISIT HI MDM: CPT

## 2022-09-14 PROCEDURE — 71045 X-RAY EXAM CHEST 1 VIEW: CPT | Mod: 26

## 2022-09-14 RX ORDER — LISINOPRIL 2.5 MG/1
1 TABLET ORAL
Qty: 0 | Refills: 0 | DISCHARGE

## 2022-09-14 RX ORDER — METOPROLOL TARTRATE 50 MG
0 TABLET ORAL
Qty: 0 | Refills: 0 | DISCHARGE

## 2022-09-14 RX ORDER — ASPIRIN/CALCIUM CARB/MAGNESIUM 324 MG
0 TABLET ORAL
Qty: 0 | Refills: 0 | DISCHARGE

## 2022-09-14 RX ORDER — ASPIRIN/CALCIUM CARB/MAGNESIUM 324 MG
1 TABLET ORAL
Qty: 0 | Refills: 0 | DISCHARGE

## 2022-09-14 RX ORDER — LISINOPRIL 2.5 MG/1
0 TABLET ORAL
Qty: 0 | Refills: 0 | DISCHARGE

## 2022-09-14 NOTE — ED PROVIDER NOTE - NS ED ATTENDING STATEMENT MOD
This was a shared visit with the TREVOR. I reviewed and verified the documentation and independently performed the documented:

## 2022-09-14 NOTE — ED PROVIDER NOTE - OBJECTIVE STATEMENT
71 yo male, pmh of mdd, htn, hld, cad on asa s/p cabg, presents to ed for b/l leg swelling, x 1 week s/p several days of lasix with minimal improvement, no pain or radiation. denies fever, chills, cp, sob, abd pain, nvd.

## 2022-09-14 NOTE — ED PROVIDER NOTE - CLINICAL SUMMARY MEDICAL DECISION MAKING FREE TEXT BOX
73yo history of HTN DL CAD CABG presenting with b/l LE swelling x1wk. No other acute complaints. No chest pain, shortness of breath. Well appearing, NAD, non toxic. NCAT PERRLA EOMI neck supple non tender normal wob cta bl rrr abdomen s nt nd no rebound no guarding WWPx4 neuro non focal 2+ pitting edema to the bilateral lower extremities. No overlying skin changes. labs imaging reviewed. Aware of all results, given a copy of all available results, comfortable with discharge and follow-up outpatient, strict return precautions given. Endorses understanding of all of this and aware that they can return at any time for new or concerning symptoms. No further questions or concerns at this time

## 2022-09-14 NOTE — ED PROVIDER NOTE - PATIENT PORTAL LINK FT
You can access the FollowMyHealth Patient Portal offered by [facility] by registering at the following website: [email or web address]. By joining [facility]’s FollowMyHealth portal, you will also be able to view your health information using other applications (apps) compatible with our system.

## 2022-09-14 NOTE — ED PROVIDER NOTE - PHYSICAL EXAMINATION
Physical Exam    Vital Signs: I have reviewed the initial vital signs.  Constitutional: appears stated age, no acute distress  Eyes: Conjunctiva pink, Sclera clear  Cardiovascular: S1 and S2, regular rate, regular rhythm, well-perfused extremities, radial pulses equal and 2+, pedal pulses 2+ and equal  Respiratory: unlabored respiratory effort, clear to auscultation bilaterally no wheezing, rales and rhonchi  Gastrointestinal: soft, non-tender abdomen, no pulsatile mass, normal bowl sounds  Musculoskeletal: supple neck, + b/l lower extremity edema, no midline tenderness  Integumentary: warm, dry, no rash  Neurologic: awake, alert, nvi

## 2023-04-05 ENCOUNTER — APPOINTMENT (OUTPATIENT)
Dept: VASCULAR SURGERY | Facility: CLINIC | Age: 73
End: 2023-04-05

## 2023-06-13 ENCOUNTER — EMERGENCY (EMERGENCY)
Facility: HOSPITAL | Age: 73
LOS: 0 days | Discharge: LEFT BEFORE DISCHARGE | End: 2023-06-13
Attending: EMERGENCY MEDICINE
Payer: MEDICARE

## 2023-06-13 VITALS
RESPIRATION RATE: 18 BRPM | OXYGEN SATURATION: 97 % | DIASTOLIC BLOOD PRESSURE: 71 MMHG | SYSTOLIC BLOOD PRESSURE: 133 MMHG | HEART RATE: 55 BPM | TEMPERATURE: 99 F

## 2023-06-13 VITALS
DIASTOLIC BLOOD PRESSURE: 70 MMHG | OXYGEN SATURATION: 98 % | SYSTOLIC BLOOD PRESSURE: 130 MMHG | TEMPERATURE: 98 F | HEART RATE: 60 BPM | RESPIRATION RATE: 18 BRPM

## 2023-06-13 DIAGNOSIS — Z53.29 PROCEDURE AND TREATMENT NOT CARRIED OUT BECAUSE OF PATIENT'S DECISION FOR OTHER REASONS: ICD-10-CM

## 2023-06-13 DIAGNOSIS — R55 SYNCOPE AND COLLAPSE: ICD-10-CM

## 2023-06-13 DIAGNOSIS — I25.10 ATHEROSCLEROTIC HEART DISEASE OF NATIVE CORONARY ARTERY WITHOUT ANGINA PECTORIS: ICD-10-CM

## 2023-06-13 DIAGNOSIS — R00.1 BRADYCARDIA, UNSPECIFIED: ICD-10-CM

## 2023-06-13 DIAGNOSIS — E78.5 HYPERLIPIDEMIA, UNSPECIFIED: ICD-10-CM

## 2023-06-13 DIAGNOSIS — Z91.048 OTHER NONMEDICINAL SUBSTANCE ALLERGY STATUS: ICD-10-CM

## 2023-06-13 DIAGNOSIS — Z95.1 PRESENCE OF AORTOCORONARY BYPASS GRAFT: Chronic | ICD-10-CM

## 2023-06-13 DIAGNOSIS — Z79.82 LONG TERM (CURRENT) USE OF ASPIRIN: ICD-10-CM

## 2023-06-13 DIAGNOSIS — I10 ESSENTIAL (PRIMARY) HYPERTENSION: ICD-10-CM

## 2023-06-13 LAB
ALBUMIN SERPL ELPH-MCNC: 4.3 G/DL — SIGNIFICANT CHANGE UP (ref 3.5–5.2)
ALP SERPL-CCNC: 76 U/L — SIGNIFICANT CHANGE UP (ref 30–115)
ALT FLD-CCNC: 26 U/L — SIGNIFICANT CHANGE UP (ref 0–41)
ANION GAP SERPL CALC-SCNC: 9 MMOL/L — SIGNIFICANT CHANGE UP (ref 7–14)
APAP SERPL-MCNC: <5 UG/ML — LOW (ref 10–30)
APTT BLD: 29.8 SEC — SIGNIFICANT CHANGE UP (ref 27–39.2)
AST SERPL-CCNC: 23 U/L — SIGNIFICANT CHANGE UP (ref 0–41)
BASOPHILS # BLD AUTO: 0.04 K/UL — SIGNIFICANT CHANGE UP (ref 0–0.2)
BASOPHILS NFR BLD AUTO: 0.8 % — SIGNIFICANT CHANGE UP (ref 0–1)
BILIRUB SERPL-MCNC: 1.5 MG/DL — HIGH (ref 0.2–1.2)
BUN SERPL-MCNC: 18 MG/DL — SIGNIFICANT CHANGE UP (ref 10–20)
CALCIUM SERPL-MCNC: 9.3 MG/DL — SIGNIFICANT CHANGE UP (ref 8.4–10.5)
CHLORIDE SERPL-SCNC: 104 MMOL/L — SIGNIFICANT CHANGE UP (ref 98–110)
CO2 SERPL-SCNC: 24 MMOL/L — SIGNIFICANT CHANGE UP (ref 17–32)
CREAT SERPL-MCNC: 0.8 MG/DL — SIGNIFICANT CHANGE UP (ref 0.7–1.5)
EGFR: 95 ML/MIN/1.73M2 — SIGNIFICANT CHANGE UP
EOSINOPHIL # BLD AUTO: 0.18 K/UL — SIGNIFICANT CHANGE UP (ref 0–0.7)
EOSINOPHIL NFR BLD AUTO: 3.6 % — SIGNIFICANT CHANGE UP (ref 0–8)
ETHANOL SERPL-MCNC: <10 MG/DL — SIGNIFICANT CHANGE UP
GLUCOSE SERPL-MCNC: 166 MG/DL — HIGH (ref 70–99)
HCT VFR BLD CALC: 42.2 % — SIGNIFICANT CHANGE UP (ref 42–52)
HGB BLD-MCNC: 14.4 G/DL — SIGNIFICANT CHANGE UP (ref 14–18)
IMM GRANULOCYTES NFR BLD AUTO: 0.4 % — HIGH (ref 0.1–0.3)
INR BLD: 1.14 RATIO — SIGNIFICANT CHANGE UP (ref 0.65–1.3)
LACTATE SERPL-SCNC: 1.6 MMOL/L — SIGNIFICANT CHANGE UP (ref 0.7–2)
LYMPHOCYTES # BLD AUTO: 1.63 K/UL — SIGNIFICANT CHANGE UP (ref 1.2–3.4)
LYMPHOCYTES # BLD AUTO: 33 % — SIGNIFICANT CHANGE UP (ref 20.5–51.1)
MCHC RBC-ENTMCNC: 28.9 PG — SIGNIFICANT CHANGE UP (ref 27–31)
MCHC RBC-ENTMCNC: 34.1 G/DL — SIGNIFICANT CHANGE UP (ref 32–37)
MCV RBC AUTO: 84.6 FL — SIGNIFICANT CHANGE UP (ref 80–94)
MONOCYTES # BLD AUTO: 0.38 K/UL — SIGNIFICANT CHANGE UP (ref 0.1–0.6)
MONOCYTES NFR BLD AUTO: 7.7 % — SIGNIFICANT CHANGE UP (ref 1.7–9.3)
NEUTROPHILS # BLD AUTO: 2.69 K/UL — SIGNIFICANT CHANGE UP (ref 1.4–6.5)
NEUTROPHILS NFR BLD AUTO: 54.5 % — SIGNIFICANT CHANGE UP (ref 42.2–75.2)
NRBC # BLD: 0 /100 WBCS — SIGNIFICANT CHANGE UP (ref 0–0)
PLATELET # BLD AUTO: 180 K/UL — SIGNIFICANT CHANGE UP (ref 130–400)
PMV BLD: 11.1 FL — HIGH (ref 7.4–10.4)
POTASSIUM SERPL-MCNC: 4.2 MMOL/L — SIGNIFICANT CHANGE UP (ref 3.5–5)
POTASSIUM SERPL-SCNC: 4.2 MMOL/L — SIGNIFICANT CHANGE UP (ref 3.5–5)
PROT SERPL-MCNC: 6.5 G/DL — SIGNIFICANT CHANGE UP (ref 6–8)
PROTHROM AB SERPL-ACNC: 13.1 SEC — HIGH (ref 9.95–12.87)
RBC # BLD: 4.99 M/UL — SIGNIFICANT CHANGE UP (ref 4.7–6.1)
RBC # FLD: 12.4 % — SIGNIFICANT CHANGE UP (ref 11.5–14.5)
SALICYLATES SERPL-MCNC: <0.3 MG/DL — LOW (ref 4–30)
SODIUM SERPL-SCNC: 137 MMOL/L — SIGNIFICANT CHANGE UP (ref 135–146)
TROPONIN T SERPL-MCNC: <0.01 NG/ML — SIGNIFICANT CHANGE UP
WBC # BLD: 4.94 K/UL — SIGNIFICANT CHANGE UP (ref 4.8–10.8)
WBC # FLD AUTO: 4.94 K/UL — SIGNIFICANT CHANGE UP (ref 4.8–10.8)

## 2023-06-13 PROCEDURE — 71045 X-RAY EXAM CHEST 1 VIEW: CPT | Mod: 26

## 2023-06-13 PROCEDURE — 99285 EMERGENCY DEPT VISIT HI MDM: CPT

## 2023-06-13 PROCEDURE — 80307 DRUG TEST PRSMV CHEM ANLYZR: CPT

## 2023-06-13 PROCEDURE — 36415 COLL VENOUS BLD VENIPUNCTURE: CPT

## 2023-06-13 PROCEDURE — 85610 PROTHROMBIN TIME: CPT

## 2023-06-13 PROCEDURE — 80053 COMPREHEN METABOLIC PANEL: CPT

## 2023-06-13 PROCEDURE — 93010 ELECTROCARDIOGRAM REPORT: CPT

## 2023-06-13 PROCEDURE — 84484 ASSAY OF TROPONIN QUANT: CPT

## 2023-06-13 PROCEDURE — 82962 GLUCOSE BLOOD TEST: CPT

## 2023-06-13 PROCEDURE — 85730 THROMBOPLASTIN TIME PARTIAL: CPT

## 2023-06-13 PROCEDURE — 71045 X-RAY EXAM CHEST 1 VIEW: CPT

## 2023-06-13 PROCEDURE — 93005 ELECTROCARDIOGRAM TRACING: CPT

## 2023-06-13 PROCEDURE — 99285 EMERGENCY DEPT VISIT HI MDM: CPT | Mod: 25

## 2023-06-13 PROCEDURE — 87040 BLOOD CULTURE FOR BACTERIA: CPT

## 2023-06-13 PROCEDURE — 99284 EMERGENCY DEPT VISIT MOD MDM: CPT

## 2023-06-13 PROCEDURE — 85025 COMPLETE CBC W/AUTO DIFF WBC: CPT

## 2023-06-13 PROCEDURE — 83605 ASSAY OF LACTIC ACID: CPT

## 2023-06-13 RX ORDER — SODIUM CHLORIDE 9 MG/ML
1000 INJECTION, SOLUTION INTRAVENOUS ONCE
Refills: 0 | Status: COMPLETED | OUTPATIENT
Start: 2023-06-13 | End: 2023-06-13

## 2023-06-13 RX ADMIN — SODIUM CHLORIDE 1000 MILLILITER(S): 9 INJECTION, SOLUTION INTRAVENOUS at 12:34

## 2023-06-13 NOTE — ED PROVIDER NOTE - ATTENDING APP SHARED VISIT CONTRIBUTION OF CARE
72 yo M PMHx noted presents via EMS for evaluation after being found in parking lot unresponsive by staff.  Per EMS pt was Alert and awake when they arrived on scene.  Pt currently without complaints. On exam pt in NAD AAO x 3, no signs of head trauma, PERRL, OP clear, Lungs cta b/l, abd soft nt nd, no edema, seen ambulate with steady gait

## 2023-06-13 NOTE — ED ADULT NURSE NOTE - NSFALLUNIVINTERV_ED_ALL_ED
Bed/Stretcher in lowest position, wheels locked, appropriate side rails in place/Call bell, personal items and telephone in reach/Instruct patient to call for assistance before getting out of bed/chair/stretcher/Non-slip footwear applied when patient is off stretcher/[place] to call system/Physically safe environment - no spills, clutter or unnecessary equipment/Purposeful proactive rounding/Room/bathroom lighting operational, light cord in reach

## 2023-06-13 NOTE — ED PROVIDER NOTE - OBJECTIVE STATEMENT
72 yo male, pmho f cad, htn, hld, presents for likely syncope, was found on ground, brought in by ems, no complaints at this time. Denies fever, chills, cp, sob, neck pain, visual changes, nvd, dizziness, numbness, tingling.

## 2023-06-13 NOTE — ED ADULT NURSE NOTE - CHIEF COMPLAINT QUOTE
as per ems, [facility] care staff found patient on the parking lot floor, EMS states patient was in a wc conscious. no complaints from patient

## 2023-06-13 NOTE — ED PROVIDER NOTE - PATIENT PORTAL LINK FT
You can access the FollowMyHealth Patient Portal offered by [facility] by registering at the following website: [email or web address]. By joining [facility]’s FollowMyHealth portal, you will also be able to view your health information using other applications (apps) compatible with our system.

## 2023-06-13 NOTE — ED PROVIDER NOTE - CLINICAL SUMMARY MEDICAL DECISION MAKING FREE TEXT BOX
Labs and imaging obtained. Pt however wants to leave prior to completion of work up.  He is AAO x 3 and understands risks.  will AMA. will f/u with PMD

## 2023-06-13 NOTE — ED PROVIDER NOTE - CCCP TRG CHIEF CMPLNT
Subjective


Progress Note Date: 07/17/22


Principal diagnosis: 





Left great toe infection





Patient presents with infection toe infection status post internal fixation mateo 

Roanoke-Chowan Hospital Rx#:[id number]   


 


    ceFAZolin 2 gm In Sodium 50 50 





    Chloride 0.9% 50 ml @ 100   





    mls/hr IVPB Q8HR [facility] Rx#   





    :[id number]   


 


[id number]


   





(2) Hypertension


Narrative/Plan: 


Continue current regimen with Lisinoprill and Metoprolol and titrate as needed


Current Visit: Yes   Status: Acute   Code(s): I10 - ESSENTIAL (PRIMARY) 

HYPERTENSION   SNOMED Code(s): 81014123


   





(3) Depression


Narrative/Plan: 


Continue Cymbalta well controlled 


Current Visit: Yes   Status: Acute   Code(s): F32.A - DEPRESSION, UNSPECIFIED   

SNOMED Code(s): 38260744


   


Plan: 





Continue IV antibiotics, PICC inserted 7/15/22 , see chief complaint quote

## 2023-06-13 NOTE — ED ADULT TRIAGE NOTE - CHIEF COMPLAINT QUOTE
as per ems, [facility] care staff found patient on the parking lot floor, EMS states patient was in a wc conscious. no complaints from patient

## 2023-06-13 NOTE — ED ADULT TRIAGE NOTE - INTERNATIONAL TRAVEL
-dermatitis   Patient acknowledges and states understanding of plan   Patient to use medication as prescribed    Limit hot water to 10 minutes   Use soft soap- such as Dove   Follow up with dermatology   Follow up in office if no improvement    Labs today     
No

## 2023-06-18 LAB
CULTURE RESULTS: SIGNIFICANT CHANGE UP
CULTURE RESULTS: SIGNIFICANT CHANGE UP
SPECIMEN SOURCE: SIGNIFICANT CHANGE UP
SPECIMEN SOURCE: SIGNIFICANT CHANGE UP

## 2023-08-13 NOTE — PHYSICAL THERAPY INITIAL EVALUATION ADULT - GAIT DEVIATIONS NOTED, PT EVAL
Pt arrives to ED c/o right upper dental x 1 week. Expresses concerns for blood pressure.   decreased nori/decreased step length/decreased stride length

## 2024-08-15 NOTE — ED ADULT TRIAGE NOTE - GLASGOW COMA SCALE: BEST VERBAL RESPONSE, MLM
(V5) oriented Instructions: This plan will send the code FBSD to the PM system.  DO NOT or CHANGE the price. Detail Level: Simple Price (Do Not Change): 0.00

## 2025-05-08 NOTE — ED ADULT TRIAGE NOTE - BEFAST EYES
May 8, 2025     Patient: Alfonzo Helton  YOB: 2013  Date of Visit: 5/8/2025      To Whom it May Concern:    Alfonzo Helton is under my professional care. Alfonzo was seen in my office on 5/8/2025. Alfonzo may return to school on 5/12/25 or on 5/9/25 if still fever free for 24 hours. Please excuse on 5/8/25 and 5/9/25 if he cannot attend .    If you have any questions or concerns, please don't hesitate to call.         Sincerely,          Jessie Matthews PA-C      
No